# Patient Record
Sex: MALE | Race: WHITE | NOT HISPANIC OR LATINO
[De-identification: names, ages, dates, MRNs, and addresses within clinical notes are randomized per-mention and may not be internally consistent; named-entity substitution may affect disease eponyms.]

---

## 2017-02-07 ENCOUNTER — APPOINTMENT (OUTPATIENT)
Dept: CT IMAGING | Facility: CLINIC | Age: 52
End: 2017-02-07

## 2017-02-07 ENCOUNTER — OUTPATIENT (OUTPATIENT)
Dept: OUTPATIENT SERVICES | Facility: HOSPITAL | Age: 52
LOS: 1 days | End: 2017-02-07
Payer: COMMERCIAL

## 2017-02-07 DIAGNOSIS — Z00.8 ENCOUNTER FOR OTHER GENERAL EXAMINATION: ICD-10-CM

## 2017-02-07 PROCEDURE — 74177 CT ABD & PELVIS W/CONTRAST: CPT

## 2017-02-07 PROCEDURE — 71260 CT THORAX DX C+: CPT

## 2017-02-10 DIAGNOSIS — R91.8 OTHER NONSPECIFIC ABNORMAL FINDING OF LUNG FIELD: ICD-10-CM

## 2017-02-10 DIAGNOSIS — K52.9 NONINFECTIVE GASTROENTERITIS AND COLITIS, UNSPECIFIED: ICD-10-CM

## 2018-12-12 ENCOUNTER — OUTPATIENT (OUTPATIENT)
Dept: OUTPATIENT SERVICES | Facility: HOSPITAL | Age: 53
LOS: 1 days | End: 2018-12-12
Payer: COMMERCIAL

## 2018-12-12 ENCOUNTER — APPOINTMENT (OUTPATIENT)
Dept: ULTRASOUND IMAGING | Facility: CLINIC | Age: 53
End: 2018-12-12

## 2018-12-12 DIAGNOSIS — Z00.8 ENCOUNTER FOR OTHER GENERAL EXAMINATION: ICD-10-CM

## 2018-12-12 PROCEDURE — 76536 US EXAM OF HEAD AND NECK: CPT

## 2020-08-24 ENCOUNTER — EMERGENCY (EMERGENCY)
Facility: HOSPITAL | Age: 55
LOS: 1 days | Discharge: HOME | End: 2020-08-24
Attending: EMERGENCY MEDICINE
Payer: COMMERCIAL

## 2020-08-24 VITALS — SYSTOLIC BLOOD PRESSURE: 162 MMHG | DIASTOLIC BLOOD PRESSURE: 106 MMHG | HEART RATE: 74 BPM

## 2020-08-24 VITALS
RESPIRATION RATE: 16 BRPM | WEIGHT: 240.08 LBS | HEIGHT: 70 IN | OXYGEN SATURATION: 99 % | TEMPERATURE: 98 F | SYSTOLIC BLOOD PRESSURE: 157 MMHG | DIASTOLIC BLOOD PRESSURE: 102 MMHG | HEART RATE: 78 BPM

## 2020-08-24 PROCEDURE — 72100 X-RAY EXAM L-S SPINE 2/3 VWS: CPT

## 2020-08-24 PROCEDURE — 73030 X-RAY EXAM OF SHOULDER: CPT

## 2020-08-24 PROCEDURE — 99284 EMERGENCY DEPT VISIT MOD MDM: CPT | Mod: 25

## 2020-08-24 PROCEDURE — 73030 X-RAY EXAM OF SHOULDER: CPT | Mod: 26,LT

## 2020-08-24 PROCEDURE — 72100 X-RAY EXAM L-S SPINE 2/3 VWS: CPT | Mod: 26

## 2020-08-24 PROCEDURE — 70450 CT HEAD/BRAIN W/O DYE: CPT

## 2020-08-24 PROCEDURE — 70450 CT HEAD/BRAIN W/O DYE: CPT | Mod: 26

## 2020-08-24 PROCEDURE — 99285 EMERGENCY DEPT VISIT HI MDM: CPT

## 2020-08-24 RX ORDER — ACETAMINOPHEN 500 MG
975 TABLET ORAL ONCE
Refills: 0 | Status: COMPLETED | OUTPATIENT
Start: 2020-08-24 | End: 2020-08-24

## 2020-08-24 RX ORDER — IBUPROFEN 200 MG
600 TABLET ORAL ONCE
Refills: 0 | Status: COMPLETED | OUTPATIENT
Start: 2020-08-24 | End: 2020-08-24

## 2020-08-24 RX ADMIN — Medication 600 MILLIGRAM(S): at 13:05

## 2020-08-24 RX ADMIN — Medication 975 MILLIGRAM(S): at 11:35

## 2020-08-24 RX ADMIN — Medication 975 MILLIGRAM(S): at 12:05

## 2020-08-24 NOTE — ED PROVIDER NOTE - PHYSICAL EXAMINATION
CONSTITUTIONAL: Patient is awake, alert and oriented x 3. Patient is well appearing and in no acute distress.  HEAD: Small hematoma w/ abrasion left forehead ,   EYES: PERRL b/l, EOMI,   LUNGS: CTA B/L,  HEART: RRR.  ABDOMEN: Soft nd/nt, no rebound or guarding.   EXTREMITY: no edema or calf tenderness b/l, FROM right upper and lower ext b/l. LUE: FROM at wrist and elbow. Pain limited aBduction at shoulder w/ full ability adduct, flex, extend internally and externally rotate at left shoulder. No midline cervical, thoracic or lumbar ttp. There is ttp over muscles surrounding left scapula. + ttp right paraspinal lumbar area  SKIN: abrasions left forearm   NEURO: Cn3-12 grossly intact. Strength5/5UE/LE.NmlSensation.Gait normal.

## 2020-08-24 NOTE — ED PROVIDER NOTE - OBJECTIVE STATEMENT
55 year old male with pmhx acid reflux, HLD presents to ED c/o dizziness, vomiting, right lower back pain and left shoulder pain s/p fall 2 days ago. Pt works as a . Was standing on a ladder when the ladder fell out from under him causing him to fall to the ground. States hard hat took initial impact and then struck left side of his head. Does not believe he lost consciousness but states his workers reported he was a bit confused initial after fall. Pt seen at [facility] that day and given pain medication and diagnosed with concussion. Since has developed dizziness w/ HA and vomiting yesterday. He reports persistent pain in the left shoulder as well as pain in the lower back. Pt has been ambulatory and has not taken anything for pain thus far. Denies fevers, chills, cough, chest pain, sob, abd pain, diarrhea, vision changes, numbness, weakness.

## 2020-08-24 NOTE — ED PROVIDER NOTE - PROGRESS NOTE DETAILS
Pt imaging unremarkable. Family update on results and advised can  patient. Will give folow up for ortho as well as spine for persistent pain and return precautions   Amy Wolfe PA-C

## 2020-08-24 NOTE — ED PROVIDER NOTE - CARE PROVIDER_API CALL
Roberto Nassar  ORTHOPAEDIC SURGERY  [address] [place] [address], [address]  [place] [address]  Phone: [phone]  Fax: [phone]  Follow Up Time:

## 2020-08-24 NOTE — ED ADULT TRIAGE NOTE - CHIEF COMPLAINT QUOTE
fall with head injury on Saturday, Pt has had dizziness since with vomiting yesterday, woke up today still dizzy. Pt also complains of left shoulder pain and right sided lower back pain.

## 2020-08-24 NOTE — ED PROVIDER NOTE - NSFOLLOWUPINSTRUCTIONS_ED_ALL_ED_FT
1. Stay hydrated. Apply Ice 20mins on, 40 mins off alternate with heat in cycle: 20 mins on, 40 mins off. Avoid strenuous activity, twisting and heavy lifting.  2. Take Ibuprofen 600mg every 6hrs for pain as needed- take with food. Alternate with Tylenol 1g every 6 hours   3. Follow up with your PCP  24-48 hours  4. For persistent back pain follow up with the Spine [facility] by calling [phone]  5. For persistent shoulder pain follow up with Orthopedics, see above for follow up information   6. Return to ED for worsening of symptoms including increased pain, numbness, weakness, worsened headaches, uncontrolled nausea and vomiting and all other concerns.

## 2020-08-24 NOTE — ED PROVIDER NOTE - CARE PLAN
Principal Discharge DX:	Closed head injury  Secondary Diagnosis:	Lower back injury  Secondary Diagnosis:	Shoulder injury

## 2020-08-24 NOTE — ED PROVIDER NOTE - PATIENT PORTAL LINK FT
You can access the FollowMyHealth Patient Portal offered by [facility] by registering at the following website: [email or web address]. By joining [employer]’s FollowMyHealth portal, you will also be able to view your health information using other applications (apps) compatible with our system.

## 2020-08-24 NOTE — ED PROVIDER NOTE - ATTENDING CONTRIBUTION TO CARE
Patient presenting complaining of headache, L shoulder pain and R lower back pain.  Fall two days ago off approx 6-8 foot ladder, struck head during fall.  Limited memory of event.  Seen at [facility] after fall, discharged with diagnosis of concussion, no imaging performed at time.  Since that point has been feeling lightheaded, nauseated, headaches.  Also with shoulder pains without loss of ROM.  Not on blood thinners.  Denying chest pains, shortness of breath, abdominal pains.    Exam:  General: Patient well appearing, vital signs within normal limits  HEENT: airway patent with moist mucous membranes, pupils equal, round and reactive  Cardiac: RRR S1/S2 with strong peripheral pulses  Respiratory: lungs clear without respiratory distress  GI: abdomen soft, non tender, non distended  Neuro: no gross neurologic deficits, CN II-XII intact, normal strength, sensation, coordination  MSK: no midline spinal tenderness, + spasm of R lumbar musculature reproduces complaint, palpitation of L supraspinatus reproduces shoulder pain  Skin: warm, well perfused  Psych: normal mood and affect    Discussed with patient, diagnosis of concussion from [place] seems likely correct, will obtain CT head in Emergency Department to r/o associated bleed.  Will obtain imaging of L shoulder as well.  Pain control.  Likely outpatient follow up with concussion clinic.

## 2020-08-31 ENCOUNTER — EMERGENCY (EMERGENCY)
Facility: HOSPITAL | Age: 55
LOS: 1 days | Discharge: HOME | End: 2020-08-31
Attending: EMERGENCY MEDICINE | Admitting: EMERGENCY MEDICINE
Payer: COMMERCIAL

## 2020-08-31 VITALS
SYSTOLIC BLOOD PRESSURE: 151 MMHG | TEMPERATURE: 99 F | DIASTOLIC BLOOD PRESSURE: 100 MMHG | RESPIRATION RATE: 18 BRPM | HEART RATE: 73 BPM | OXYGEN SATURATION: 99 %

## 2020-08-31 VITALS
HEIGHT: 70 IN | TEMPERATURE: 98 F | HEART RATE: 83 BPM | RESPIRATION RATE: 18 BRPM | WEIGHT: 240.08 LBS | SYSTOLIC BLOOD PRESSURE: 148 MMHG | DIASTOLIC BLOOD PRESSURE: 94 MMHG | OXYGEN SATURATION: 97 %

## 2020-08-31 PROBLEM — Z78.9 OTHER SPECIFIED HEALTH STATUS: Chronic | Status: ACTIVE | Noted: 2020-08-24

## 2020-08-31 LAB
ALBUMIN SERPL ELPH-MCNC: 4.4 G/DL — SIGNIFICANT CHANGE UP (ref 3.3–5)
ALP SERPL-CCNC: 52 U/L — SIGNIFICANT CHANGE UP (ref 40–120)
ALT FLD-CCNC: 17 U/L — SIGNIFICANT CHANGE UP (ref 10–45)
ANION GAP SERPL CALC-SCNC: 13 MMOL/L — SIGNIFICANT CHANGE UP (ref 5–17)
AST SERPL-CCNC: 13 U/L — SIGNIFICANT CHANGE UP (ref 10–40)
BASOPHILS # BLD AUTO: 0.07 K/UL — SIGNIFICANT CHANGE UP (ref 0–0.2)
BASOPHILS NFR BLD AUTO: 0.8 % — SIGNIFICANT CHANGE UP (ref 0–2)
BILIRUB SERPL-MCNC: 0.4 MG/DL — SIGNIFICANT CHANGE UP (ref 0.2–1.2)
BUN SERPL-MCNC: 11 MG/DL — SIGNIFICANT CHANGE UP (ref 7–23)
CALCIUM SERPL-MCNC: 9.5 MG/DL — SIGNIFICANT CHANGE UP (ref 8.4–10.5)
CHLORIDE SERPL-SCNC: 106 MMOL/L — SIGNIFICANT CHANGE UP (ref 96–108)
CO2 SERPL-SCNC: 23 MMOL/L — SIGNIFICANT CHANGE UP (ref 22–31)
CREAT SERPL-MCNC: 0.96 MG/DL — SIGNIFICANT CHANGE UP (ref 0.5–1.3)
EOSINOPHIL # BLD AUTO: 0.42 K/UL — SIGNIFICANT CHANGE UP (ref 0–0.5)
EOSINOPHIL NFR BLD AUTO: 4.6 % — SIGNIFICANT CHANGE UP (ref 0–6)
GLUCOSE SERPL-MCNC: 120 MG/DL — HIGH (ref 70–99)
HCT VFR BLD CALC: 49.4 % — SIGNIFICANT CHANGE UP (ref 39–50)
HGB BLD-MCNC: 16.2 G/DL — SIGNIFICANT CHANGE UP (ref 13–17)
IMM GRANULOCYTES NFR BLD AUTO: 0.1 % — SIGNIFICANT CHANGE UP (ref 0–1.5)
LYMPHOCYTES # BLD AUTO: 2.58 K/UL — SIGNIFICANT CHANGE UP (ref 1–3.3)
LYMPHOCYTES # BLD AUTO: 28 % — SIGNIFICANT CHANGE UP (ref 13–44)
MCHC RBC-ENTMCNC: 28.2 PG — SIGNIFICANT CHANGE UP (ref 27–34)
MCHC RBC-ENTMCNC: 32.8 GM/DL — SIGNIFICANT CHANGE UP (ref 32–36)
MCV RBC AUTO: 86.1 FL — SIGNIFICANT CHANGE UP (ref 80–100)
MONOCYTES # BLD AUTO: 0.56 K/UL — SIGNIFICANT CHANGE UP (ref 0–0.9)
MONOCYTES NFR BLD AUTO: 6.1 % — SIGNIFICANT CHANGE UP (ref 2–14)
NEUTROPHILS # BLD AUTO: 5.59 K/UL — SIGNIFICANT CHANGE UP (ref 1.8–7.4)
NEUTROPHILS NFR BLD AUTO: 60.4 % — SIGNIFICANT CHANGE UP (ref 43–77)
NRBC # BLD: 0 /100 WBCS — SIGNIFICANT CHANGE UP (ref 0–0)
PLATELET # BLD AUTO: 242 K/UL — SIGNIFICANT CHANGE UP (ref 150–400)
POTASSIUM SERPL-MCNC: 4.1 MMOL/L — SIGNIFICANT CHANGE UP (ref 3.5–5.3)
POTASSIUM SERPL-SCNC: 4.1 MMOL/L — SIGNIFICANT CHANGE UP (ref 3.5–5.3)
PROT SERPL-MCNC: 7.1 G/DL — SIGNIFICANT CHANGE UP (ref 6–8.3)
RBC # BLD: 5.74 M/UL — SIGNIFICANT CHANGE UP (ref 4.2–5.8)
RBC # FLD: 13.7 % — SIGNIFICANT CHANGE UP (ref 10.3–14.5)
SODIUM SERPL-SCNC: 142 MMOL/L — SIGNIFICANT CHANGE UP (ref 135–145)
WBC # BLD: 9.23 K/UL — SIGNIFICANT CHANGE UP (ref 3.8–10.5)
WBC # FLD AUTO: 9.23 K/UL — SIGNIFICANT CHANGE UP (ref 3.8–10.5)

## 2020-08-31 PROCEDURE — 70496 CT ANGIOGRAPHY HEAD: CPT

## 2020-08-31 PROCEDURE — 70450 CT HEAD/BRAIN W/O DYE: CPT

## 2020-08-31 PROCEDURE — 70498 CT ANGIOGRAPHY NECK: CPT

## 2020-08-31 PROCEDURE — 99284 EMERGENCY DEPT VISIT MOD MDM: CPT | Mod: 25

## 2020-08-31 PROCEDURE — 85027 COMPLETE CBC AUTOMATED: CPT

## 2020-08-31 PROCEDURE — 93005 ELECTROCARDIOGRAM TRACING: CPT

## 2020-08-31 PROCEDURE — 70496 CT ANGIOGRAPHY HEAD: CPT | Mod: 26

## 2020-08-31 PROCEDURE — 80053 COMPREHEN METABOLIC PANEL: CPT

## 2020-08-31 PROCEDURE — 96360 HYDRATION IV INFUSION INIT: CPT | Mod: XU

## 2020-08-31 PROCEDURE — 99285 EMERGENCY DEPT VISIT HI MDM: CPT

## 2020-08-31 PROCEDURE — 93010 ELECTROCARDIOGRAM REPORT: CPT

## 2020-08-31 PROCEDURE — 70498 CT ANGIOGRAPHY NECK: CPT | Mod: 26

## 2020-08-31 RX ORDER — MECLIZINE HCL 12.5 MG
1 TABLET ORAL
Qty: 15 | Refills: 0
Start: 2020-08-31 | End: 2020-09-04

## 2020-08-31 RX ORDER — ACETAMINOPHEN 500 MG
975 TABLET ORAL ONCE
Refills: 0 | Status: COMPLETED | OUTPATIENT
Start: 2020-08-31 | End: 2020-08-31

## 2020-08-31 RX ORDER — SODIUM CHLORIDE 9 MG/ML
500 INJECTION INTRAMUSCULAR; INTRAVENOUS; SUBCUTANEOUS ONCE
Refills: 0 | Status: COMPLETED | OUTPATIENT
Start: 2020-08-31 | End: 2020-08-31

## 2020-08-31 RX ORDER — MECLIZINE HCL 12.5 MG
25 TABLET ORAL ONCE
Refills: 0 | Status: COMPLETED | OUTPATIENT
Start: 2020-08-31 | End: 2020-08-31

## 2020-08-31 RX ADMIN — Medication 25 MILLIGRAM(S): at 12:15

## 2020-08-31 RX ADMIN — SODIUM CHLORIDE 500 MILLILITER(S): 9 INJECTION INTRAMUSCULAR; INTRAVENOUS; SUBCUTANEOUS at 13:57

## 2020-08-31 RX ADMIN — SODIUM CHLORIDE 500 MILLILITER(S): 9 INJECTION INTRAMUSCULAR; INTRAVENOUS; SUBCUTANEOUS at 15:00

## 2020-08-31 NOTE — ED PROVIDER NOTE - CHILD ABUSE FACILITY
Jose Antonio,     Your blood pressure and exam look good.     I encourage you to have colon screening. If you want to pursue colonoscopy call [facility] [phone] Dr. August's . Let her know that you are calling to arrange a date and time for the procedure.    Alternatively you can have the Cologuard test. Check with your insurance on coverage and let me know if you want to do that.     We will check your cholesterol, sugar, kidney and liver function, and PSA (prostate blood test).     I suggest the Shingrix shingles vaccine. This is recommended for everyone 50 years old and above. There are 2 doses 2 to 6 months apart. It is on backorder so call and get on a list at the pharmacy.      Try Lotrimin AF cream for your toes and feet, 2 times per day for 2 weeks and see if the dry skin and irritation improves.     The tingling of your feet suggests peripheral neuropathy. We can treat that with oral medication if you wish.     You received a flu vaccine.     Continue to stay active.        [facility]

## 2020-08-31 NOTE — ED PROVIDER NOTE - PATIENT PORTAL LINK FT
You can access the FollowMyHealth Patient Portal offered by [facility] by registering at the following website: [email or web address]. By joining [employer]’s FollowMyHealth portal, you will also be able to view your health information using other applications (apps) compatible with our system.

## 2020-08-31 NOTE — ED PROVIDER NOTE - OBJECTIVE STATEMENT
56 yo M with hx of HLD presents with persistent dizziness and left sided neck pain for 1 week in the setting of a recent mechanical fall. Reports episodic "room spinning" sensation, induced by head movement, lasting seconds with associated unilateral headache and neck pain. Better by motrin or tylenol. Denies visual deficits, chest pain or sob. Was seen at [facility] 1 week ago and at this hospital a few days ago with normal CT.

## 2020-08-31 NOTE — ED ADULT NURSE NOTE - OBJECTIVE STATEMENT
54 yo male presents to ED c/o persistent dizziness and L sided neck pain x1 week, s/p mechanical fall off 6 foot ladder. Patient reports intermittent room spinning, worse with head movement. Patient states he was seen at [facility] 1 week ago and [facility] this week with normal CTs. Patient denies SOB, CP, nvd, fever/chills, sick contacts, visual changes, numbness, weakness. Patient A&OX3, breathing spontaneously, airway patent, bl clear lungs, abdomen nontender, +pulses, cap refill <2 seconds, ambulating in straight line without difficulty. Patient resting in bed, side rails up, plan of care explained. MD at bedside.

## 2020-08-31 NOTE — ED PROVIDER NOTE - ATTENDING CONTRIBUTION TO CARE
RGUJRAL 56yo male hx HLD s/p fall from a ladder 1 week ago presents with headache, neck pain and dizziness. States he was seen here on 8/24 had n/v  x 2 d. Still reports L side HA, neck pain and dizziness when he moves his head. No chest pain, palp, sob. Pt has now begin tolerating but less then normal.  On exam,   Patient is awake, alert x 3.  GCS15. NCAT, PERRL. +  horizontal nystagmus.  Neck: No Posterior midline cervical spine tenderness. Full ROM and neuro intact. L paraspinal tenderness  Chest is clear to auscultation. +S1S2.  Abdomen is soft nondistended/nontender +BS. No rebound or guarding.   Pelvis is stable. Full ROM B/L hips.   Back non tender midline T/L spine.  Skin intact   Check Labs, CT head/neck to eval for dissection. Pain control.

## 2020-08-31 NOTE — ED PROVIDER NOTE - CLINICAL SUMMARY MEDICAL DECISION MAKING FREE TEXT BOX
56 yo M with hx of HLD presents with persistent dizziness and left sided neck pain for 1 week in the setting of a recent mechanical fall. VS WNL Horizontal nystagmus. No focal sensory or motor deficits, normal CN exam, no dysmetria, able to ambulate in a straight line. VS WNL. Consistent with peripheral vertigo likely induced by post-concussion syndrome. However, given persistent headache and neck pain, will evaluate for cervical artery dissection and delayed subdural hematoma. Unlikely from a cardiac etiology. Will check electrolytes, ekg, ct head noncon, ct head / neck angio cleo + symptomatic treatment.

## 2020-08-31 NOTE — ED PROVIDER NOTE - NSFOLLOWUPINSTRUCTIONS_ED_ALL_ED_FT
You were seen in the ED for dizziness  The following labs/imaging were obtained: see attached (if applicable)  Take meclizine as instructed. Avoid driving while taking this med.   Return to the ED if you develop leg or arm weakness, slurred speech, inability to ambulate or worsening or new concerning symptoms.  Follow up neurology in 1-2 weeks. You will be called to arrange this.   Discussed with pt results of work up, strict return precautions, and need for follow up.  Pt expressed understanding and agrees with plan.

## 2020-08-31 NOTE — ED ADULT NURSE REASSESSMENT NOTE - NS ED NURSE REASSESS COMMENT FT1
Report received from lolis Arthur RN. Pt AAOx4, NAD, resp nonlabored, skin warm/dry, resting comfortably in bed with call bell at bedside. Pt c/o 4/10 back pain but denies Tylenol at this time. Pt denies headache, dizziness, chest pain, palpitations, SOB, abd pain, n/v/d, urinary symptoms, fevers, chills, weakness at this time. Pt awaiting CT. Safety maintained.

## 2020-08-31 NOTE — ED PROVIDER NOTE - PHYSICAL EXAMINATION
Gen: AAOx3, non-toxic  Head: NCAT  HEENT: EOMI, oral mucosa moist, normal conjunctiva  Lung: CTAB, no respiratory distress, no wheezes/rhonchi/rales B/L, speaking in full sentences  CV: RRR, no murmurs, rubs or gallops  Abd: soft, NTND, no guarding, no CVA tenderness  MSK: left sided posterior and anterior neck pain.   Neuro: Horizontal nystagmus. No focal sensory or motor deficits, normal CN exam, no dysmetria, able to ambulate in a straight line.   Skin: Warm, well perfused, no rash  Psych: normal affect.

## 2020-08-31 NOTE — ED PROVIDER NOTE - NS ED ROS FT
GENERAL: No fever or chills  EYES: no change in vision  HEENT: see hpi  CARDIAC: no chest pain or palpiations   PULMONARY: no cough or SOB  GI: no abdominal pain, nausea, vomiting, diarrhea, or constipation   : No changes in urination  SKIN: no rashes  NEURO: see hpi  MSK: see hpi

## 2021-08-11 NOTE — ED ADULT NURSE NOTE - BREATH SOUNDS, MLM
Received message from patient's nurse stating:    pt is having some nosebleeds w/ some clots, vitals stable, tried to offer interventions and rationales explained that it could be from ASA and Eliquis. Patient would like to speak with provider. Discussion / orders:    Per patient's nurse, nosebleed only occurs when patient blows his nose. He does have CBC ordered for a.m. I did present to patient bedside and spoke with him. There is no blood in sputum when patient coughs. Does have bleeding when he blows his nose. · Ordered saline nasal spray, 2 sprays each nostril q2h as needed for nasal dryness or congestion. Please note that this note was dictated using Dragon computer voice recognition software. Quite often unanticipated grammatical, syntax, homophones, and other interpretive errors are inadvertently transcribed by the computer software. Please disregard these errors. Please excuse any errors that have escaped final proofreading. Clear

## 2022-12-14 PROBLEM — Z00.00 ENCOUNTER FOR PREVENTIVE HEALTH EXAMINATION: Status: ACTIVE | Noted: 2022-12-14

## 2022-12-20 ENCOUNTER — NON-APPOINTMENT (OUTPATIENT)
Age: 57
End: 2022-12-20

## 2022-12-20 ENCOUNTER — APPOINTMENT (OUTPATIENT)
Dept: INTERNAL MEDICINE | Facility: CLINIC | Age: 57
End: 2022-12-20

## 2022-12-20 VITALS
HEART RATE: 89 BPM | WEIGHT: 249 LBS | BODY MASS INDEX: 35.65 KG/M2 | SYSTOLIC BLOOD PRESSURE: 142 MMHG | TEMPERATURE: 97.8 F | OXYGEN SATURATION: 97 % | DIASTOLIC BLOOD PRESSURE: 88 MMHG | HEIGHT: 70 IN

## 2022-12-20 PROCEDURE — G0296 VISIT TO DETERM LDCT ELIG: CPT

## 2022-12-20 PROCEDURE — 99386 PREV VISIT NEW AGE 40-64: CPT | Mod: 25

## 2022-12-26 LAB
25(OH)D3 SERPL-MCNC: 19.8 NG/ML
ALBUMIN SERPL ELPH-MCNC: 4.3 G/DL
ALP BLD-CCNC: 79 U/L
ALT SERPL-CCNC: 20 U/L
ANION GAP SERPL CALC-SCNC: 12 MMOL/L
APPEARANCE: CLEAR
AST SERPL-CCNC: 10 U/L
BACTERIA: NEGATIVE
BASOPHILS # BLD AUTO: 0.05 K/UL
BASOPHILS NFR BLD AUTO: 0.5 %
BILIRUB SERPL-MCNC: 0.4 MG/DL
BILIRUBIN URINE: NEGATIVE
BLOOD URINE: NEGATIVE
BUN SERPL-MCNC: 11 MG/DL
CALCIUM SERPL-MCNC: 9.5 MG/DL
CHLORIDE SERPL-SCNC: 104 MMOL/L
CHOLEST SERPL-MCNC: 185 MG/DL
CO2 SERPL-SCNC: 26 MMOL/L
COLOR: YELLOW
CREAT SERPL-MCNC: 1.02 MG/DL
EGFR: 86 ML/MIN/1.73M2
EOSINOPHIL # BLD AUTO: 0.24 K/UL
EOSINOPHIL NFR BLD AUTO: 2.5 %
ESTIMATED AVERAGE GLUCOSE: 126 MG/DL
GLUCOSE QUALITATIVE U: NEGATIVE
GLUCOSE SERPL-MCNC: 102 MG/DL
HBA1C MFR BLD HPLC: 6 %
HCT VFR BLD CALC: 44 %
HDLC SERPL-MCNC: 33 MG/DL
HGB BLD-MCNC: 14.2 G/DL
HYALINE CASTS: 0 /LPF
IMM GRANULOCYTES NFR BLD AUTO: 0.3 %
KETONES URINE: NEGATIVE
LDLC SERPL CALC-MCNC: 113 MG/DL
LEUKOCYTE ESTERASE URINE: NEGATIVE
LYMPHOCYTES # BLD AUTO: 2.58 K/UL
LYMPHOCYTES NFR BLD AUTO: 27 %
MAN DIFF?: NORMAL
MCHC RBC-ENTMCNC: 26.7 PG
MCHC RBC-ENTMCNC: 32.3 GM/DL
MCV RBC AUTO: 82.7 FL
MICROSCOPIC-UA: NORMAL
MONOCYTES # BLD AUTO: 0.62 K/UL
MONOCYTES NFR BLD AUTO: 6.5 %
NEUTROPHILS # BLD AUTO: 6.02 K/UL
NEUTROPHILS NFR BLD AUTO: 63.2 %
NITRITE URINE: NEGATIVE
NONHDLC SERPL-MCNC: 152 MG/DL
PH URINE: 6
PLATELET # BLD AUTO: 284 K/UL
POTASSIUM SERPL-SCNC: 4.2 MMOL/L
PROT SERPL-MCNC: 6.8 G/DL
PROTEIN URINE: NEGATIVE
PSA SERPL-MCNC: 0.68 NG/ML
RBC # BLD: 5.32 M/UL
RBC # FLD: 15.1 %
RED BLOOD CELLS URINE: 2 /HPF
SODIUM SERPL-SCNC: 142 MMOL/L
SPECIFIC GRAVITY URINE: 1.02
SQUAMOUS EPITHELIAL CELLS: 0 /HPF
TRIGL SERPL-MCNC: 195 MG/DL
TSH SERPL-ACNC: 2.07 UIU/ML
UROBILINOGEN URINE: NORMAL
VIT B12 SERPL-MCNC: 302 PG/ML
WBC # FLD AUTO: 9.54 K/UL
WHITE BLOOD CELLS URINE: 0 /HPF

## 2023-01-04 ENCOUNTER — NON-APPOINTMENT (OUTPATIENT)
Age: 58
End: 2023-01-04

## 2023-01-04 VITALS — HEIGHT: 70 IN | WEIGHT: 249 LBS | BODY MASS INDEX: 35.65 KG/M2

## 2023-01-05 ENCOUNTER — OUTPATIENT (OUTPATIENT)
Dept: OUTPATIENT SERVICES | Facility: HOSPITAL | Age: 58
LOS: 1 days | End: 2023-01-05
Payer: MEDICAID

## 2023-01-05 ENCOUNTER — APPOINTMENT (OUTPATIENT)
Dept: CT IMAGING | Facility: CLINIC | Age: 58
End: 2023-01-05

## 2023-01-05 ENCOUNTER — RESULT REVIEW (OUTPATIENT)
Age: 58
End: 2023-01-05

## 2023-01-05 DIAGNOSIS — F17.210 NICOTINE DEPENDENCE, CIGARETTES, UNCOMPLICATED: ICD-10-CM

## 2023-01-05 DIAGNOSIS — K29.60 OTHER GASTRITIS W/OUT BLEEDING: ICD-10-CM

## 2023-01-05 PROCEDURE — 71271 CT THORAX LUNG CANCER SCR C-: CPT

## 2023-04-03 ENCOUNTER — RX RENEWAL (OUTPATIENT)
Age: 58
End: 2023-04-03

## 2023-06-30 ENCOUNTER — RX RENEWAL (OUTPATIENT)
Age: 58
End: 2023-06-30

## 2023-08-21 ENCOUNTER — LABORATORY RESULT (OUTPATIENT)
Age: 58
End: 2023-08-21

## 2023-08-21 ENCOUNTER — APPOINTMENT (OUTPATIENT)
Dept: INTERNAL MEDICINE | Facility: CLINIC | Age: 58
End: 2023-08-21
Payer: MEDICAID

## 2023-08-21 VITALS
OXYGEN SATURATION: 98 % | TEMPERATURE: 97.8 F | WEIGHT: 247 LBS | HEART RATE: 93 BPM | HEIGHT: 69 IN | BODY MASS INDEX: 36.58 KG/M2 | SYSTOLIC BLOOD PRESSURE: 128 MMHG | DIASTOLIC BLOOD PRESSURE: 87 MMHG

## 2023-08-21 PROCEDURE — 99214 OFFICE O/P EST MOD 30 MIN: CPT | Mod: 25

## 2023-08-21 RX ORDER — METRONIDAZOLE 500 MG/1
500 TABLET ORAL 3 TIMES DAILY
Qty: 21 | Refills: 0 | Status: ACTIVE | COMMUNITY
Start: 2023-08-21 | End: 1900-01-01

## 2023-08-21 NOTE — ASSESSMENT
[FreeTextEntry1] : Blood work done today Will check lipid panel Blood pressure is stable Given previous episodes of diverticulitis-treat with cipro and flagyl Call office if worsening symptoms-check UA Consider CT scan if no improvement

## 2023-08-21 NOTE — HISTORY OF PRESENT ILLNESS
[FreeTextEntry1] : Patient presents for follow-up.  [de-identified] : Approximately 2 weeks ago did bilateral abdominal pain similar to previous diverticulitis, took Cipro and Flagyl with alleviation of symptoms did have leftover at home currently started having pain again denies any blood in the stool denies any fevers chills.  Denies any nausea vomiting.  Did have a colonoscopy done denies any urinary symptoms. Denies any chest pain chest tightness shortness of breath orthopnea paroxysmal nocturnal dyspnea

## 2023-08-21 NOTE — PHYSICAL EXAM
[Normal] : no carotid or abdominal bruits heard, no varicosities, pedal pulses are present, no peripheral edema, no extremity clubbing or cyanosis and no palpable aorta [de-identified] : tenderness to palpation suprapubic region, left lower quadrant pain tenderness to deep palpation no rebound or guarding

## 2023-08-23 ENCOUNTER — EMERGENCY (EMERGENCY)
Facility: HOSPITAL | Age: 58
LOS: 1 days | Discharge: HOME | End: 2023-08-23
Attending: EMERGENCY MEDICINE
Payer: MEDICAID

## 2023-08-23 VITALS
DIASTOLIC BLOOD PRESSURE: 77 MMHG | SYSTOLIC BLOOD PRESSURE: 155 MMHG | RESPIRATION RATE: 19 BRPM | HEART RATE: 84 BPM | TEMPERATURE: 98 F | OXYGEN SATURATION: 99 %

## 2023-08-23 VITALS
RESPIRATION RATE: 18 BRPM | OXYGEN SATURATION: 96 % | TEMPERATURE: 98 F | HEIGHT: 70 IN | SYSTOLIC BLOOD PRESSURE: 140 MMHG | WEIGHT: 244.93 LBS | DIASTOLIC BLOOD PRESSURE: 90 MMHG | HEART RATE: 93 BPM

## 2023-08-23 DIAGNOSIS — Z98.890 OTHER SPECIFIED POSTPROCEDURAL STATES: Chronic | ICD-10-CM

## 2023-08-23 DIAGNOSIS — Z98.1 ARTHRODESIS STATUS: Chronic | ICD-10-CM

## 2023-08-23 LAB
ALBUMIN SERPL ELPH-MCNC: 4.3 G/DL — SIGNIFICANT CHANGE UP (ref 3.3–5)
ALP SERPL-CCNC: 63 U/L — SIGNIFICANT CHANGE UP (ref 40–120)
ALT FLD-CCNC: 16 U/L — SIGNIFICANT CHANGE UP (ref 10–45)
ANION GAP SERPL CALC-SCNC: 13 MMOL/L — SIGNIFICANT CHANGE UP (ref 5–17)
AST SERPL-CCNC: 14 U/L — SIGNIFICANT CHANGE UP (ref 10–40)
BASOPHILS # BLD AUTO: 0.05 K/UL — SIGNIFICANT CHANGE UP (ref 0–0.2)
BASOPHILS NFR BLD AUTO: 0.5 % — SIGNIFICANT CHANGE UP (ref 0–2)
BILIRUB SERPL-MCNC: 0.3 MG/DL — SIGNIFICANT CHANGE UP (ref 0.2–1.2)
BUN SERPL-MCNC: 12 MG/DL — SIGNIFICANT CHANGE UP (ref 7–23)
CALCIUM SERPL-MCNC: 9 MG/DL — SIGNIFICANT CHANGE UP (ref 8.4–10.5)
CHLORIDE SERPL-SCNC: 106 MMOL/L — SIGNIFICANT CHANGE UP (ref 96–108)
CK SERPL-CCNC: 382 U/L — HIGH (ref 30–200)
CO2 SERPL-SCNC: 25 MMOL/L — SIGNIFICANT CHANGE UP (ref 22–31)
CREAT SERPL-MCNC: 0.88 MG/DL — SIGNIFICANT CHANGE UP (ref 0.5–1.3)
EGFR: 100 ML/MIN/1.73M2 — SIGNIFICANT CHANGE UP
EOSINOPHIL # BLD AUTO: 0.09 K/UL — SIGNIFICANT CHANGE UP (ref 0–0.5)
EOSINOPHIL NFR BLD AUTO: 0.9 % — SIGNIFICANT CHANGE UP (ref 0–6)
GLUCOSE SERPL-MCNC: 133 MG/DL — HIGH (ref 70–99)
HCT VFR BLD CALC: 45.3 % — SIGNIFICANT CHANGE UP (ref 39–50)
HGB BLD-MCNC: 14.2 G/DL — SIGNIFICANT CHANGE UP (ref 13–17)
IMM GRANULOCYTES NFR BLD AUTO: 0.3 % — SIGNIFICANT CHANGE UP (ref 0–0.9)
LYMPHOCYTES # BLD AUTO: 2.37 K/UL — SIGNIFICANT CHANGE UP (ref 1–3.3)
LYMPHOCYTES # BLD AUTO: 23.1 % — SIGNIFICANT CHANGE UP (ref 13–44)
MCHC RBC-ENTMCNC: 26.9 PG — LOW (ref 27–34)
MCHC RBC-ENTMCNC: 31.3 GM/DL — LOW (ref 32–36)
MCV RBC AUTO: 86 FL — SIGNIFICANT CHANGE UP (ref 80–100)
MONOCYTES # BLD AUTO: 0.54 K/UL — SIGNIFICANT CHANGE UP (ref 0–0.9)
MONOCYTES NFR BLD AUTO: 5.3 % — SIGNIFICANT CHANGE UP (ref 2–14)
NEUTROPHILS # BLD AUTO: 7.16 K/UL — SIGNIFICANT CHANGE UP (ref 1.8–7.4)
NEUTROPHILS NFR BLD AUTO: 69.9 % — SIGNIFICANT CHANGE UP (ref 43–77)
NRBC # BLD: 0 /100 WBCS — SIGNIFICANT CHANGE UP (ref 0–0)
PLATELET # BLD AUTO: 290 K/UL — SIGNIFICANT CHANGE UP (ref 150–400)
POTASSIUM SERPL-MCNC: 4 MMOL/L — SIGNIFICANT CHANGE UP (ref 3.5–5.3)
POTASSIUM SERPL-SCNC: 4 MMOL/L — SIGNIFICANT CHANGE UP (ref 3.5–5.3)
PROT SERPL-MCNC: 7.5 G/DL — SIGNIFICANT CHANGE UP (ref 6–8.3)
RBC # BLD: 5.27 M/UL — SIGNIFICANT CHANGE UP (ref 4.2–5.8)
RBC # FLD: 14.8 % — HIGH (ref 10.3–14.5)
SODIUM SERPL-SCNC: 144 MMOL/L — SIGNIFICANT CHANGE UP (ref 135–145)
WBC # BLD: 10.24 K/UL — SIGNIFICANT CHANGE UP (ref 3.8–10.5)
WBC # FLD AUTO: 10.24 K/UL — SIGNIFICANT CHANGE UP (ref 3.8–10.5)

## 2023-08-23 PROCEDURE — 70450 CT HEAD/BRAIN W/O DYE: CPT | Mod: MA

## 2023-08-23 PROCEDURE — 80053 COMPREHEN METABOLIC PANEL: CPT

## 2023-08-23 PROCEDURE — 85025 COMPLETE CBC W/AUTO DIFF WBC: CPT

## 2023-08-23 PROCEDURE — 72170 X-RAY EXAM OF PELVIS: CPT | Mod: 26

## 2023-08-23 PROCEDURE — 72170 X-RAY EXAM OF PELVIS: CPT

## 2023-08-23 PROCEDURE — 82550 ASSAY OF CK (CPK): CPT

## 2023-08-23 PROCEDURE — 96365 THER/PROPH/DIAG IV INF INIT: CPT

## 2023-08-23 PROCEDURE — 71045 X-RAY EXAM CHEST 1 VIEW: CPT

## 2023-08-23 PROCEDURE — 71045 X-RAY EXAM CHEST 1 VIEW: CPT | Mod: 26

## 2023-08-23 PROCEDURE — 99284 EMERGENCY DEPT VISIT MOD MDM: CPT | Mod: 25

## 2023-08-23 PROCEDURE — 70450 CT HEAD/BRAIN W/O DYE: CPT | Mod: 26,MA

## 2023-08-23 PROCEDURE — 72100 X-RAY EXAM L-S SPINE 2/3 VWS: CPT | Mod: 26

## 2023-08-23 PROCEDURE — 72125 CT NECK SPINE W/O DYE: CPT | Mod: MA

## 2023-08-23 PROCEDURE — 72125 CT NECK SPINE W/O DYE: CPT | Mod: 26,MA

## 2023-08-23 PROCEDURE — 99284 EMERGENCY DEPT VISIT MOD MDM: CPT

## 2023-08-23 PROCEDURE — 72100 X-RAY EXAM L-S SPINE 2/3 VWS: CPT

## 2023-08-23 RX ORDER — LIDOCAINE 4 G/100G
1 CREAM TOPICAL ONCE
Refills: 0 | Status: COMPLETED | OUTPATIENT
Start: 2023-08-23 | End: 2023-08-23

## 2023-08-23 RX ORDER — ACETAMINOPHEN 500 MG
1000 TABLET ORAL ONCE
Refills: 0 | Status: COMPLETED | OUTPATIENT
Start: 2023-08-23 | End: 2023-08-23

## 2023-08-23 RX ADMIN — Medication 1000 MILLIGRAM(S): at 16:30

## 2023-08-23 RX ADMIN — Medication 400 MILLIGRAM(S): at 15:59

## 2023-08-23 RX ADMIN — LIDOCAINE 1 PATCH: 4 CREAM TOPICAL at 15:58

## 2023-08-23 NOTE — ED ADULT NURSE NOTE - NS ED NURSE LEVEL OF CONSCIOUSNESS ORIENTATION
Pt took the last of his medication today, is out. Please rush as urgent.    Will  at , please call.    Thanks  Anjum SHERIFF  Team Coodinator     Oriented - self; Oriented - place; Oriented - time

## 2023-08-23 NOTE — ED ADULT TRIAGE NOTE - CHIEF COMPLAINT QUOTE
fell down 12 steps at home 45min ago states he hit his head he is able to walk but is in a lot of back and neck pain. hx spinal infusion sx 1/22 &  back sx 8/29/22. denies AC use, denies LOC

## 2023-08-23 NOTE — ED ADULT NURSE NOTE - NSFALLRISKINTERV_ED_ALL_ED

## 2023-08-23 NOTE — ED PROVIDER NOTE - NSFOLLOWUPINSTRUCTIONS_ED_ALL_ED_FT
Follow-up with your primary care provider within 2-3 days.   Call the spine center at [phone] for a Follow-up within 1 week if symptoms persist.  Bring any printed results to discuss with your PCP.    If you require the disc of results from your imaging study or any additional results, please call medical records at [phone].     Pain can be managed with Acetaminophen (aka Tylenol) and over the counter as directed. You may also use over the counter lidocaine patch or Salonpas patch for pain.    Continue to take all other medications as directed.    Pain may worsens 1-2 days after the accident, this is typical however if your pain persists, becomes severe, or if you experience any severe headache, midline spine/back pain, vomiting, loss of vision, numbness/tingling, weakness, difficulty urinating or defecating (pooping), confusion, loss of consciousness (passing out), chest pain, or if any other concerning symptoms please return to the ER.

## 2023-08-23 NOTE — ED PROVIDER NOTE - CLINICAL SUMMARY MEDICAL DECISION MAKING FREE TEXT BOX
58M hx lumbar spinal fusion (Aug 22) and disc replacement x3 in c spine (Jan 22), presenting s/p mechanical fall down 12 stairs at home 1h ago. Denying LOC, vomiting, changed in mental status; not on thinners. Will obtain imaging CXR, pelvis XR; adding CTH and CT spine given head strike, decreased ROM of c spine and prior surgeries in the area. Lower concern for syncopal etiology of fall given lack of prodromal symptoms and description of a mechanical fall, will defer syncopal w/u at this point. Pain control with 1g Tylenol IV. 58M hx lumbar spinal fusion (Aug 22) and disc replacement x3 in c spine (Jan 22), presenting s/p mechanical fall down 12 stairs at home 1h ago. Denying LOC, vomiting, changed in mental status; not on thinners,  awake alert . Will obtain imaging CXR, pelvis XR; adding CTH and CT spine given head strike, decreased ROM of c spine and prior surgeries in the area. Lower concern for syncopal etiology of fall given lack of prodromal symptoms and description of a mechanical fall, will defer syncopal w/u at this point. Pain control with 1g Tylenol Iv. reassess ZR

## 2023-08-23 NOTE — ED PROVIDER NOTE - PHYSICAL EXAMINATION
PHYSICAL EXAM:  GENERAL: NAD, lying in bed comfortably  HEAD:  nontender to palpation, no appreciable skull fractures, no lacerations  EYES: EOMI, PERRLA, conjunctiva and sclera clear  ENT: Moist mucous membranes  NECK: Supple, No JVD; mildly decreased ROM with rotation of head to the L; pain with movement of c spine in all directions; no midline TTP  CHEST/LUNG: Clear to auscultation bilaterally; No rales, rhonchi, wheezing, or rubs. Unlabored respirations  HEART: Regular rate and rhythm; No murmurs, rubs, or gallops  ABDOMEN: Soft, Nontender, Nondistended   EXTREMITIES:  2+ Peripheral Pulses, brisk capillary refill. No clubbing, cyanosis, or edema  NERVOUS SYSTEM:  Alert & Oriented X3, speech clear. No deficits   MSK: FROM all 4 extremities, full and equal strength; no midline spine tenderness to palpation   SKIN: abrasions diffusely over over PHYSICAL EXAM:  GENERAL: NAD, lying in bed comfortably  HEAD:  nontender to palpation, no appreciable skull fractures, no lacerations  EYES: EOMI, PERRLA, conjunctiva and sclera clear  ENT: Moist mucous membranes  NECK: Supple, No JVD; mildly decreased ROM with rotation of head to the L; pain with movement of c spine in all directions; no midline TTP  CHEST/LUNG: Clear to auscultation bilaterally; No rales, rhonchi, wheezing, or rubs. Unlabored respirations  HEART: Regular rate and rhythm; No murmurs, rubs, or gallops  ABDOMEN: Soft, Nontender, Nondistended   EXTREMITIES:  2+ Peripheral Pulses, brisk capillary refill. No clubbing, cyanosis, or edema  NERVOUS SYSTEM:  Alert & Oriented X3, speech clear. No deficits   MSK: FROM all 4 extremities, full and equal strength; no midline spine tenderness to palpation   SKIN: abrasions diffusely over back

## 2023-08-23 NOTE — ED ADULT NURSE NOTE - OBJECTIVE STATEMENT
59 y/o male, A&O x3, presents to ED c/o fall. PMH lumbar spinal fusion 8/22 and disc replacement in C spine 1/22. Pt endorses loosing his balance and RLE weakness that made him fall down 12 stairs, landing on his back. Pt endorses hitting his head on the way down but no LOC. Pt endorses dizziness, headache, and pinching sensation in RLE. Upon assessment pupils reactive, brisk, 3mm bilaterally and equal strength in all extremities. Pt denies blood thinners, LOC, chest pain, SOB, abdominal pain, and n/v/d. Pt comfortably resting in C-collar in bed locked in lowest position.

## 2023-08-23 NOTE — ED PROVIDER NOTE - PATIENT PORTAL LINK FT
You can access the FollowMyHealth Patient Portal offered by [facility] by registering at the following website: [email or web address]. By joining [employer]’s FollowMyHealth portal, you will also be able to view your health information using other applications (apps) compatible with our system.

## 2023-08-23 NOTE — ED PROVIDER NOTE - CROS ED ROS STATEMENT
Pre-Operative H & P     ADDENDUM:  The patient completed her carotid US and echo. The patient is optimized for her procedure on the [facility].       Carotid US  IMPRESSION:     1. RIGHT ICA: 50-69% diameter stenosis by peak systolic velocity of  213 cm/s, but no plaque visualized and ICA/CCA ratio less than 2.     2. LEFT ICA:  Normal.      Echo  Interpretation Summary  No structural cause for palpitations was identified.  Global and regional left ventricular function is normal with an EF of 55-60%.  Global right ventricular function is normal. The right ventricle is normal  size.  No significant valvular abnormalities.  The patent foramen ovale was demonstrated by color Doppler.  The estimated PA systolic pressure is 21 mmHg.  IVC diameter <2.1 cm collapsing >50% with sniff suggests a normal RA pressure  of 3 mmHg.  There is no prior study for direct comparison.        CC:  Preoperative exam to assess for increased cardiopulmonary risk while undergoing surgery and anesthesia.    Date of Encounter: 2/8/2022  Primary Care Physician:  Malissa Harvey     Reason for visit:   Encounter Diagnoses   Name Primary?     Pre-op examination Yes     Encounter for elective termination of pregnancy        HPI  Katrina Hillman is a 19 year old female who presents for pre-operative H & P in preparation for  Procedure Information     Case: [id number] Date/Time: 02/09/22 0730    Procedure: DILATION AND CURETTAGE, UTERUS, USING SUCTION (N/A Uterus)    Anesthesia type: Choice    Diagnosis: Encounter for elective termination of pregnancy [Z33.2]    Pre-op diagnosis: Encounter for elective termination of pregnancy [Z33.2]    Location:  OR 07 / [facility] OR    Providers: Jen Cisneros MD      The patient is an 19-year-old woman with a past medical history significant for Pamella-Parkinson-White syndrome seen on Zio patch in 2019, palpitations, dyspnea on exertion, right-sided bruit, former smoker, marijuana user, GERD, history of  UTIs, depression, anxiety and history of substance abuse.  The patient presented to her OB on January 20, 2022 for discussion of pregnancy and termination.  During that visit it was discussed given her cardiac history at that the best location for this would be at the [facility].  She was seen virtually by Dr. Aguero on 2/2/2022 and counseled for the procedure as above.    History is obtained from the patient and chart review    Hx of abnormal bleeding or anti-platelet use: none    Menstrual history: No LMP recorded.: currently pregnant      Past Medical History  Past Medical History:   Diagnosis Date     Abdominal pain     04/24/08,x6 months thought to be secondary to GERD (upper GI with small bowel followthrough scheduled)     Anxiety      Carotid bruit      Constipation     No Comments Provided     Depression      Dyspnea      Encounter for initial prescription of contraceptive pills     4/14/2017     Gastroesophageal reflux disease with esophagitis      High risk heterosexual behavior 04/14/2017    G1 2/22     History of Pamella-Parkinson-White (WPW) syndrome     seen on 2019 zio     Major depressive disorder, single episode     1/15/2016     Nicotine use disorder 07/20/2018     Palpitations      Personal history of urinary tract infection      Pneumonia     2003,1 day hospitalization     Tetrahydrocannabinol (THC) use disorder, mild, abuse 07/20/2018     Tic disorder     07/2012,both motor and verbal       Past Surgical History  Past Surgical History:   Procedure Laterality Date     ESOPHAGOSCOPY, GASTROSCOPY, DUODENOSCOPY (EGD), COMBINED N/A 6/8/2018    Procedure: COMBINED ESOPHAGOSCOPY, GASTROSCOPY, DUODENOSCOPY (EGD), BIOPSY SINGLE OR MULTIPLE;  Gastroscopy;  Surgeon: Sreekanth Shaw MD;  Location: [facility] OR     HERNIA REPAIR       03/17/05,,HERNIA REPAIR,Repair of an epigastric and umbilical       Prior to Admission Medications  Current Outpatient Medications   Medication Sig Dispense  all other ROS negative except as per HPI Refill     Cholecalciferol (VITAMIN D3) 50 MCG ( UT) TABS Take 3 capsules by mouth every morning Take with food        clindamycin-benzoyl peroxide (BENZACLIN) 1-5 % external gel APPLY TOPICALLY TO FACE 1 TIME IN THE MORNING AFTER WASHING. FOLLOW WITH A MOISTURIZER AS NEEDED       Multiple Vitamins-Minerals (ONE-A-DAY WOMENS PO) Take by mouth every morning        Probiotic Product (PROBIOTIC-10 PO) Take by mouth every morning        tretinoin (RETIN-A) 0.05 % external cream APPLY A THIN LAYER TO THE FULL FACE AT BEDTIME AFTER WASHING. MAY MOISTURIZE AFTER IF NEEDED.         Allergies  No Known Allergies    Social History  Social History     Socioeconomic History     Marital status: Single     Spouse name: Not on file     Number of children: 0     Years of education: 12     Highest education level: High school graduate   Occupational History     Not on file   Tobacco Use     Smoking status: Former Smoker     Packs/day: 0.50     Years: 5.00     Pack years: 2.50     Types: Cigarettes     Quit date: 2022     Years since quittin.1     Smokeless tobacco: Never Used   Vaping Use     Vaping Use: Some days     Substances: Nicotine, Flavoring     Devices: Disposable, Refillable tank   Substance and Sexual Activity     Alcohol use: Yes     Comment: sometimes     Drug use: Yes     Types: Marijuana, Other, Methamphetamines     Comment: sober 8 months from pill and meth     Sexual activity: Yes     Partners: Male     Birth control/protection: Condom   Other Topics Concern     Not on file   Social History Narrative    Parents     Attended [employer]    Works at [employer]    America Gimenez Yanado Mother       Godwin Hillman Father       Allyn Sibling born-.      Nancy born 2009  Brother Ivan 2013     Social Determinants of Health     Financial Resource Strain: Not on file   Food Insecurity: Not on file   Transportation Needs: No Transportation Needs     Lack of Transportation (Medical): No     Lack of  Transportation (Non-Medical): No   Physical Activity: Not on file   Stress: No Stress Concern Present     Feeling of Stress : Only a little   Social Connections: Unknown     Frequency of Communication with Friends and Family: More than three times a week     Frequency of Social Gatherings with Friends and Family: Not asked     Attends Quaker Services: Not asked     Active Member of Clubs or Organizations: Not asked     Attends Club or Organization Meetings: Not asked     Marital Status: Never    Intimate Partner Violence: Unknown     Fear of Current or Ex-Partner: Not asked     Emotionally Abused: Not asked     Physically Abused: Not asked     Sexually Abused: Not asked   Housing Stability: Not on file       Family History  Family History   Problem Relation Age of Onset     Cancer Mother         Cancer,Hodgkin's lymphoma as teenager.     Other - See Comments Father         GI Disease,Ulcer, hernia     Bleeding Disorder Sister         Bloody noses     Heart Defect Maternal Grandfather      Other - See Comments Paternal Uncle         tics in childhood     Anesthesia Reaction No family hx of        Review of Systems  The complete review of systems is negative other than noted in the HPI or here.   ROS/MED HX  ENT/Pulmonary:     (+) tobacco use, Past use,  (-) recent URI   Neurologic:  - neg neurologic ROS  (-) no seizures, no CVA and no TIA   Cardiovascular: Comment: Patient was seen by Dr. Shore on 1/6/22 and plans for carotid US due to right sided bruit and echo given GAMA.     Patient has history of WPW seen on 2019 zio patch.     (+) -----GAMA. Irregular Heartbeat/Palpitations, Previous cardiac testing   Echo: Date: Results:    Stress Test: Date: Results:    ECG Reviewed: Date: 1/6/22 Results:  Bradycardia   Poss preexcitation    Cath: Date: Results:      METS/Exercise Tolerance: 4 - Raking leaves, gardening Comment: Patient reports she runs for 1-2 minutes and then gets short of breath. She has been trying  to work out more and hopes that quitting smoking will help her symptoms     Hematologic:  - neg hematologic  ROS  (-) history of blood transfusion   Musculoskeletal:  - neg musculoskeletal ROS     GI/Hepatic:     (+) GERD, Symptomatic,     Renal/Genitourinary:  - neg Renal ROS     Endo:  - neg endo ROS     Psychiatric/Substance Use:     (+) anxiety and depression Recreational drug usage: Cannabis, Meth and Other (Comment) (still using cannabis. Sober from pill and meth for 8 months).    Infectious Disease:  - neg infectious disease ROS     Malignancy:  - neg malignancy ROS     Other:      (+) Possibly pregnant (), ,          Virtual visit -  No vitals were obtained    Physical Exam  Constitutional: Awake, alert, cooperative, no apparent distress, and appears stated age.  Eyes: Pupils equal  HENT: Normocephalic. Nasal bridge and lip piercings  Respiratory: non labored breathing   Neurologic: Awake, alert, oriented to name, place and time.   Neuropsychiatric: Calm, cooperative. Normal affect.      Prior Labs/Diagnostic Studies   All labs and imaging personally reviewed     EKG 22  bradycardia   Poss preexcitation    Zio 2019  Conclusion    Pediatric Cardiac Event Monitor  17 year 8 month old     The patient was monitored for 6 days, 0 hours.  Minimum HR was 39, average HR was 72, maximum HR was 173.  There were 8 triggered events.  During the patient triggered events, the rhythm was sinus rhythm.  There were rare supraventricular ectopic beats, longest consisted of 2 beats.  There were rare ventricular ectopic beats, longest consisted of single beats.  Delta wave is seen.     Impression:  Cardiac event monitor revealing delta wave, consider Pamella-Parkinson-White syndrome.         The patient's records and results personally reviewed by this provider.     Outside records reviewed from: Care Everywhere      Assessment      Katrinajanee Hillman is a 19 year old female was seen as a PAC referral for risk assessment  and optimization for anesthesia.    Plan/Recommendations  Pt will be optimized for the proposed procedure.  See below for details on the assessment, risk, and preoperative recommendations    NEUROLOGY  - No history of TIA, CVA or seizure  -Post Op delirium risk factors:  No risk identified    ENT  - No current airway concerns.  Will need to be reassessed day of surgery.    CARDIAC  - palpitations/ WPW seen on  zio patch. The patient was seen by Dr. Shore on 22 given this history and had EKG on 22 showing sinus bradycardia with possible preexcitation. She was found to have right sided bruit and reported GAMA. Given these symptoms and finding she has been set up for Carotid US and echo later today. Depending on these results patient's location may need to be on [place].   - METS (Metabolic Equivalents)  Patient performs 4 or more METS exercise without symptoms  Total Score: 0      RCRI-Very low risk: Class 1 0.4% complication rate  Total Score: 0        PULMONARY  - Obstructive Sleep Apnea  No current risk of obstructive sleep apnea   ZAIRA Low Risk  Total Score: 0      - Denies asthma or inhaler use  - Tobacco History      History   Smoking Status     Former Smoker     Packs/day: 0.50     Years: 5.00     Types: Cigarettes     Quit date: 2022   Smokeless Tobacco     Never Used   The patient quit smoking 3 weeks ago. She reports she still does smoke marijuana. She was advised not use for 24 hours.     GI  - GERD - associated with pregnancy. Currently symptomatic. Consideration for bicitra DOS.     PONV Medium Risk  Total Score: 2           1 AN PONV: Pt is Female    1 AN PONV: Patient is not a current smoker        /RENAL  - Baseline Creatinine  0.83  ~ Encounter for elective termination of pregnancy - procedure as above. Patient is     ENDOCRINE  - BMI: There is no height or weight on file to calculate BMI.  Healthy Weight (BMI 18.5-24.9)  - No history of Diabetes Mellitus    HEME  VTE Low Risk  0.26%  Total Score: 0      - No history of abnormal bleeding or antiplatelet use.      PSYCH  - Anxiety/depression - patient reports she hasn't been on medications for 1 year and currently doesn't have a therapist she is following with. She feels things are controlled but would like to meet with new therapist in the future.     Patient was discussed with Dr Nuñez    The patient is optimized for their procedure. AVS with information on surgery time/arrival time, meds and NPO status given by nursing staff. No further diagnostic testing indicated.    Will await carotid US and echo results if patient is appropriate UR candidate.     **Please refer to the physical examination documented by the anesthesiologist in the anesthesia record on the day of surgery**        Video-Visit Details    Type of service:  Video Visit    Patient verbally consented to video service today: YES    Video Start Time: 9:05  Video End Time (time video stopped): 9:21    Originating Location (pt. Location): Home    Distant Location (provider location):  [facility]OPERAT[facility]     Mode of Communication:  Video Conference via Ztory  On the day of service:     Prep time: 5 minutes  Visit time: 16 minutes  Documentation time: 29 minutes  ------------------------------------------  Total time: 50 minutes      Sindi Patel PA-C  Preoperative Assessment Center  [facility]  Phone: [phone]  Fax: [phone]

## 2023-08-23 NOTE — ED PROVIDER NOTE - OBJECTIVE STATEMENT
58M hx lumbar spinal fusion (Aug 22) and disc replacement x3 in c spine (Jan 22), presenting s/p fall down 12 stairs at home 1h ago. Reports a pinching sensation in his leg, when it buckled and he subsequently fell. Denies any dizziness, SOB, palpitations prior to fall. During fall, struck head on multiple steps, now endorsing dizziness and HA rated at 7/10. No LOC or vomiting, not on thinners. Main complaint right now is neck and lumbar spine soreness, primarily on the L side. Was on the ground for <5m after the fall, ambulatory after the accident with pain. Spine surgeon is JR Narvaez from the [facility]. 58M hx lumbar spinal fusion (Aug 22) and disc replacement x3 in c spine (Jan 22), presenting s/p fall down 12 stairs at home 1h ago. Reports a pinching sensation in his leg, when it buckled and he subsequently fell. Denies any dizziness, SOB, palpitations prior to fall. During fall, struck head on multiple steps, now endorsing dizziness and HA rated at 7/10. No LOC, AMS or vomiting, not on thinners. Main complaint right now is neck and lumbar spine soreness, primarily on the L side. Was on the ground for <5m after the fall, ambulatory after the accident with pain. No changes in strength or sensation in b/l extremities. Spine surgeon is JR Narvaez from the [facility]. Denying CP, SOB, abd pain, n/v, dysuria, frequency.

## 2023-08-23 NOTE — ED PROVIDER NOTE - PROGRESS NOTE DETAILS
results d/w pt, all questions answered, pt feeling improved, pt ready and stable for DC. -Tito Jackson PA-C

## 2023-08-23 NOTE — ED PROVIDER NOTE - NS ED ATTENDING STATEMENT MOD
This was a shared visit with the JIAN. I reviewed and verified the documentation and independently performed the documented:

## 2023-08-26 LAB
25(OH)D3 SERPL-MCNC: 17.9 NG/ML
ALBUMIN SERPL ELPH-MCNC: 4.2 G/DL
ALP BLD-CCNC: 65 U/L
ALT SERPL-CCNC: 15 U/L
ANION GAP SERPL CALC-SCNC: 14 MMOL/L
APPEARANCE: CLEAR
AST SERPL-CCNC: 13 U/L
BILIRUB SERPL-MCNC: 0.5 MG/DL
BILIRUBIN URINE: ABNORMAL
BLOOD URINE: NEGATIVE
BUN SERPL-MCNC: 12 MG/DL
CALCIUM SERPL-MCNC: 9.3 MG/DL
CHLORIDE SERPL-SCNC: 106 MMOL/L
CHOLEST SERPL-MCNC: 145 MG/DL
CO2 SERPL-SCNC: 26 MMOL/L
COLOR: NORMAL
CREAT SERPL-MCNC: 0.98 MG/DL
EGFR: 90 ML/MIN/1.73M2
ESTIMATED AVERAGE GLUCOSE: 126 MG/DL
GLUCOSE QUALITATIVE U: NEGATIVE MG/DL
GLUCOSE SERPL-MCNC: 151 MG/DL
HBA1C MFR BLD HPLC: 6 %
HDLC SERPL-MCNC: 35 MG/DL
KETONES URINE: ABNORMAL MG/DL
LDLC SERPL CALC-MCNC: 87 MG/DL
LEUKOCYTE ESTERASE URINE: ABNORMAL
NITRITE URINE: NEGATIVE
NONHDLC SERPL-MCNC: 110 MG/DL
PH URINE: 5.5
POTASSIUM SERPL-SCNC: 4.5 MMOL/L
PROT SERPL-MCNC: 6.8 G/DL
PROTEIN URINE: NORMAL MG/DL
PSA SERPL-MCNC: 0.82 NG/ML
SODIUM SERPL-SCNC: 146 MMOL/L
SPECIFIC GRAVITY URINE: >1.03
TRIGL SERPL-MCNC: 132 MG/DL
TSH SERPL-ACNC: 1.63 UIU/ML
UROBILINOGEN URINE: 1 MG/DL
VIT B12 SERPL-MCNC: 362 PG/ML

## 2023-09-27 ENCOUNTER — RX RENEWAL (OUTPATIENT)
Age: 58
End: 2023-09-27

## 2023-12-11 ENCOUNTER — RX RENEWAL (OUTPATIENT)
Age: 58
End: 2023-12-11

## 2023-12-14 ENCOUNTER — RX RENEWAL (OUTPATIENT)
Age: 58
End: 2023-12-14

## 2023-12-14 RX ORDER — OMEPRAZOLE 40 MG/1
40 CAPSULE, DELAYED RELEASE ORAL
Qty: 30 | Refills: 11 | Status: ACTIVE | COMMUNITY
Start: 2023-01-05 | End: 1900-01-01

## 2023-12-14 RX ORDER — SIMVASTATIN 40 MG/1
40 TABLET, FILM COATED ORAL
Qty: 30 | Refills: 11 | Status: ACTIVE | COMMUNITY
Start: 2022-12-20 | End: 1900-01-01

## 2023-12-19 ENCOUNTER — NON-APPOINTMENT (OUTPATIENT)
Age: 58
End: 2023-12-19

## 2023-12-19 ENCOUNTER — APPOINTMENT (OUTPATIENT)
Dept: CARDIOLOGY | Facility: CLINIC | Age: 58
End: 2023-12-19
Payer: MEDICAID

## 2023-12-19 VITALS
BODY MASS INDEX: 36.29 KG/M2 | HEART RATE: 85 BPM | RESPIRATION RATE: 15 BRPM | WEIGHT: 245 LBS | SYSTOLIC BLOOD PRESSURE: 139 MMHG | HEIGHT: 69 IN | OXYGEN SATURATION: 99 % | DIASTOLIC BLOOD PRESSURE: 92 MMHG

## 2023-12-19 VITALS — DIASTOLIC BLOOD PRESSURE: 88 MMHG | SYSTOLIC BLOOD PRESSURE: 140 MMHG

## 2023-12-19 DIAGNOSIS — Z82.3 FAMILY HISTORY OF STROKE: ICD-10-CM

## 2023-12-19 DIAGNOSIS — R94.31 ABNORMAL ELECTROCARDIOGRAM [ECG] [EKG]: ICD-10-CM

## 2023-12-19 PROCEDURE — 93000 ELECTROCARDIOGRAM COMPLETE: CPT

## 2023-12-19 PROCEDURE — 99204 OFFICE O/P NEW MOD 45 MIN: CPT | Mod: 25

## 2024-01-02 ENCOUNTER — RX RENEWAL (OUTPATIENT)
Age: 59
End: 2024-01-02

## 2024-01-02 RX ORDER — AMLODIPINE BESYLATE 5 MG/1
5 TABLET ORAL
Qty: 90 | Refills: 3 | Status: ACTIVE | COMMUNITY
Start: 2022-12-20 | End: 1900-01-01

## 2024-01-25 ENCOUNTER — APPOINTMENT (OUTPATIENT)
Dept: INTERNAL MEDICINE | Facility: CLINIC | Age: 59
End: 2024-01-25
Payer: MEDICAID

## 2024-01-25 VITALS
SYSTOLIC BLOOD PRESSURE: 130 MMHG | DIASTOLIC BLOOD PRESSURE: 78 MMHG | BODY MASS INDEX: 35.99 KG/M2 | TEMPERATURE: 97.2 F | OXYGEN SATURATION: 97 % | HEART RATE: 79 BPM | HEIGHT: 69 IN | WEIGHT: 243 LBS

## 2024-01-25 DIAGNOSIS — Z00.00 ENCOUNTER FOR GENERAL ADULT MEDICAL EXAMINATION W/OUT ABNORMAL FINDINGS: ICD-10-CM

## 2024-01-25 DIAGNOSIS — Z23 ENCOUNTER FOR IMMUNIZATION: ICD-10-CM

## 2024-01-25 DIAGNOSIS — K57.32 DIVERTICULITIS OF LARGE INTESTINE W/OUT PERFORATION OR ABSCESS W/OUT BLEEDING: ICD-10-CM

## 2024-01-25 PROCEDURE — 90686 IIV4 VACC NO PRSV 0.5 ML IM: CPT

## 2024-01-25 PROCEDURE — G0009: CPT

## 2024-01-25 PROCEDURE — 99396 PREV VISIT EST AGE 40-64: CPT | Mod: 25

## 2024-01-25 PROCEDURE — 90677 PCV20 VACCINE IM: CPT

## 2024-01-25 PROCEDURE — 90472 IMMUNIZATION ADMIN EACH ADD: CPT

## 2024-01-25 RX ORDER — CIPROFLOXACIN HYDROCHLORIDE 500 MG/1
500 TABLET, FILM COATED ORAL TWICE DAILY
Qty: 14 | Refills: 0 | Status: ACTIVE | COMMUNITY
Start: 2024-01-25 | End: 1900-01-01

## 2024-01-27 PROBLEM — Z00.00 ANNUAL PHYSICAL EXAM: Status: ACTIVE | Noted: 2022-12-20

## 2024-02-02 LAB
25(OH)D3 SERPL-MCNC: 20.3 NG/ML
ALBUMIN SERPL ELPH-MCNC: 4.2 G/DL
ALP BLD-CCNC: 59 U/L
ALT SERPL-CCNC: 20 U/L
ANION GAP SERPL CALC-SCNC: 12 MMOL/L
APPEARANCE: CLEAR
AST SERPL-CCNC: 15 U/L
BILIRUB SERPL-MCNC: 0.4 MG/DL
BILIRUBIN URINE: NEGATIVE
BLOOD URINE: NEGATIVE
BUN SERPL-MCNC: 12 MG/DL
CALCIUM SERPL-MCNC: 9.4 MG/DL
CHLORIDE SERPL-SCNC: 104 MMOL/L
CHOLEST SERPL-MCNC: 155 MG/DL
CO2 SERPL-SCNC: 24 MMOL/L
COLOR: NORMAL
CREAT SERPL-MCNC: 0.97 MG/DL
EGFR: 90 ML/MIN/1.73M2
ESTIMATED AVERAGE GLUCOSE: 123 MG/DL
GLUCOSE QUALITATIVE U: NEGATIVE MG/DL
GLUCOSE SERPL-MCNC: 106 MG/DL
HBA1C MFR BLD HPLC: 5.9 %
HCT VFR BLD CALC: 46.8 %
HDLC SERPL-MCNC: 37 MG/DL
HGB BLD-MCNC: 15 G/DL
KETONES URINE: NEGATIVE MG/DL
LDLC SERPL CALC-MCNC: 95 MG/DL
LEUKOCYTE ESTERASE URINE: NEGATIVE
MCHC RBC-ENTMCNC: 27.6 PG
MCHC RBC-ENTMCNC: 32.1 GM/DL
MCV RBC AUTO: 86.2 FL
NITRITE URINE: NEGATIVE
NONHDLC SERPL-MCNC: 118 MG/DL
PH URINE: 6
PLATELET # BLD AUTO: 315 K/UL
POTASSIUM SERPL-SCNC: 4.5 MMOL/L
PROT SERPL-MCNC: 7 G/DL
PROTEIN URINE: NEGATIVE MG/DL
PSA SERPL-MCNC: 0.8 NG/ML
RBC # BLD: 5.43 M/UL
RBC # FLD: 14.4 %
SODIUM SERPL-SCNC: 140 MMOL/L
SPECIFIC GRAVITY URINE: 1.02
TRIGL SERPL-MCNC: 125 MG/DL
TSH SERPL-ACNC: 1.96 UIU/ML
UROBILINOGEN URINE: 0.2 MG/DL
VIT B12 SERPL-MCNC: 1103 PG/ML
WBC # FLD AUTO: 7.1 K/UL

## 2024-03-02 ENCOUNTER — RX RENEWAL (OUTPATIENT)
Age: 59
End: 2024-03-02

## 2024-03-06 ENCOUNTER — NON-APPOINTMENT (OUTPATIENT)
Age: 59
End: 2024-03-06

## 2024-03-06 VITALS — BODY MASS INDEX: 35.99 KG/M2 | HEIGHT: 69 IN | WEIGHT: 243 LBS

## 2024-03-06 DIAGNOSIS — F17.210 NICOTINE DEPENDENCE, CIGARETTES, UNCOMPLICATED: ICD-10-CM

## 2024-03-06 NOTE — PLAN
[FreeTextEntry1] : Eligibility confirmed, LDCT scheduled for 3/24/24 at [facility]

## 2024-03-06 NOTE — HISTORY OF PRESENT ILLNESS
[Current] : Current [TextBox_13] : Patient is scheduled for an annual LDCT for lung cancer screening.  Chart review performed to confirm eligibility for LDCT.    No documented personal or family history of lung cancer. No documented s/s of lung cancer. Patient is a current smoker with > 20 pack year history. [PacksperYear] : >20

## 2024-03-24 ENCOUNTER — APPOINTMENT (OUTPATIENT)
Dept: CT IMAGING | Facility: IMAGING CENTER | Age: 59
End: 2024-03-24
Payer: MEDICARE

## 2024-03-24 ENCOUNTER — OUTPATIENT (OUTPATIENT)
Dept: OUTPATIENT SERVICES | Facility: HOSPITAL | Age: 59
LOS: 1 days | End: 2024-03-24
Payer: MEDICARE

## 2024-03-24 DIAGNOSIS — F17.210 NICOTINE DEPENDENCE, CIGARETTES, UNCOMPLICATED: ICD-10-CM

## 2024-03-24 DIAGNOSIS — Z98.1 ARTHRODESIS STATUS: Chronic | ICD-10-CM

## 2024-03-24 DIAGNOSIS — Z98.890 OTHER SPECIFIED POSTPROCEDURAL STATES: Chronic | ICD-10-CM

## 2024-03-24 PROCEDURE — 71271 CT THORAX LUNG CANCER SCR C-: CPT | Mod: 26

## 2024-03-24 PROCEDURE — 71271 CT THORAX LUNG CANCER SCR C-: CPT

## 2024-04-07 ENCOUNTER — NON-APPOINTMENT (OUTPATIENT)
Age: 59
End: 2024-04-07

## 2024-04-22 NOTE — DISCUSSION/SUMMARY
[FreeTextEntry1] : IMPRESSION: Mr. Gerardo is a 58 year old male with a history of hypertension, hyperlipidemia, current smoker and diverticulosis who presents for evaluation of hypertension.   PLAN: 1. Given his abnormal EKG and cardiac risk factors, he will schedule an echocardiogram to evaluate his LV function and a nuclear stress test to evaluate for any ischemia. He had evidence of an anteroseptal infarct on his ECG that was performed today.  2. His blood pressure is slightly elevated today. He will take the Valsartan 320 mg every other day and continue on Amlodipine 5 mg daily. 3. His recent LDL cholesterol was good, thus he will continue on Simvastatin 40 mg daily and a low fat/ low cholesterol diet. 4. We discussed the importance of tobacco cessation. 5. He will follow up after his cardiac testing or sooner if he is symptomatic.  [EKG obtained to assist in diagnosis and management of assessed problem(s)] : EKG obtained to assist in diagnosis and management of assessed problem(s)

## 2024-04-22 NOTE — PHYSICAL EXAM
[Well Developed] : well developed [Well Nourished] : well nourished [No Acute Distress] : no acute distress [Normal Conjunctiva] : normal conjunctiva [Clear Lung Fields] : clear lung fields [Good Air Entry] : good air entry [No Respiratory Distress] : no respiratory distress  [Soft] : abdomen soft [Non Tender] : non-tender [Normal Gait] : normal gait [No Edema] : no edema [No Cyanosis] : no cyanosis [No Clubbing] : no clubbing [No Varicosities] : no varicosities [No Rash] : no rash [No Skin Lesions] : no skin lesions [Moves all extremities] : moves all extremities [No Focal Deficits] : no focal deficits [Normal Speech] : normal speech [Alert and Oriented] : alert and oriented [Normal memory] : normal memory [Normal Rate] : normal [Rhythm Regular] : regular [Normal S1] : normal S1 [Normal S2] : normal S2 [No Murmur] : no murmurs heard [No Pitting Edema] : no pitting edema present [Normal Venous Pressure] : normal venous pressure [S3] : no S3 [Right Carotid Bruit] : no bruit heard over the right carotid [Left Carotid Bruit] : no bruit heard over the left carotid [Bruit] : no bruit heard [Normal Bowel Sounds] : normal bowel sounds [de-identified] : No oral pallor.  [de-identified] : No visible skin ulcers.

## 2024-04-22 NOTE — CARDIOLOGY SUMMARY
[de-identified] : 12/19/2023: Sinus Rhythm at 83 bpm with left anterior fascicular block and an old anteroseptal infarct

## 2024-04-22 NOTE — HISTORY OF PRESENT ILLNESS
[FreeTextEntry1] : Patient is a 58 year old male with a history of hypertension, hyperlipidemia, current smoker, and diverticulosis who presents for evaluation of hypertension. He takes Valsartan about once a week. He states he checks his BP at home and it is 126-135 mmHg systolic. He has been feeling well, he denies any chest pain, shortness of breath, palpitations, headaches or dizziness. He feels pain on his upper left shoulder when he lays down. In January 2022 he had cervical and lumbar spinal surgery after an accident at work.

## 2024-04-23 ENCOUNTER — NON-APPOINTMENT (OUTPATIENT)
Age: 59
End: 2024-04-23

## 2024-04-23 ENCOUNTER — APPOINTMENT (OUTPATIENT)
Dept: CARDIOLOGY | Facility: CLINIC | Age: 59
End: 2024-04-23
Payer: MEDICARE

## 2024-04-23 VITALS
HEART RATE: 97 BPM | SYSTOLIC BLOOD PRESSURE: 121 MMHG | HEIGHT: 69 IN | WEIGHT: 243 LBS | BODY MASS INDEX: 35.99 KG/M2 | OXYGEN SATURATION: 95 % | RESPIRATION RATE: 12 BRPM | DIASTOLIC BLOOD PRESSURE: 81 MMHG

## 2024-04-23 PROCEDURE — 93306 TTE W/DOPPLER COMPLETE: CPT

## 2024-04-23 PROCEDURE — G2211 COMPLEX E/M VISIT ADD ON: CPT | Mod: NC,1L

## 2024-04-23 PROCEDURE — 99214 OFFICE O/P EST MOD 30 MIN: CPT | Mod: 25

## 2024-04-23 PROCEDURE — 93000 ELECTROCARDIOGRAM COMPLETE: CPT

## 2024-04-23 NOTE — DISCUSSION/SUMMARY
[FreeTextEntry1] : IMPRESSION: Mr. Gerardo is a 59 year old male with a history of hypertension, hyperlipidemia, current smoker and diverticulosis who presents for follow up of hypertension and an abnormal ECG.   PLAN: 1. He had an echocardiogram done earlier today that showed normal LV function with hypokinesis of susan basal inferior wall.  He has been scheduled for a nuclear stress test next month to evaluate for any ischemia given this abnormality as well as his abnormal ECG that shows an anteroseptal infarct.  2. His blood pressure is good, he will continue on Valsartan 320 mg every other day and continue on Amlodipine 5 mg daily.  We discussed the importance of diet modification and tobacco cessation. 3. His recent LDL cholesterol from 3 months ago was fine, thus he will continue on Simvastatin 40 mg daily and a low fat/ low cholesterol diet. 4. He had evidence of a mildly dilated aorta on his echocardiogram.  We discussed the importance of good blood pressure control as well as tobacco cessation.  He should have a repeat echocardiogram in 1 year to follow-up his dilated aorta. 5. He will follow up with me in 6 months, or sooner if he is symptomatic or should there be any abnormalities on his nuclear stress test.  [EKG obtained to assist in diagnosis and management of assessed problem(s)] : EKG obtained to assist in diagnosis and management of assessed problem(s)

## 2024-04-23 NOTE — CARDIOLOGY SUMMARY
[de-identified] : 4/23/2024: Sinus Rhythm at 75 bpm with left anterior fascicular block and poor R wave progression, consider an old anteroseptal infarct [de-identified] : 4/23/2024: Grossly preserved left ventricular ejection fraction with hypokinesis of the basal inferior wall. EF is approximately 60-65%. Concentric left ventricular remodeling. There is mild (grade 1) left ventricular diastolic dysfunction. Mildly enlarged right ventricular cavity size and normal systolic function. Mild biatrial enlargement. Mild mitral regurgitation. Mild mitral valve stenosis. Mitral annular calcification and calcified mitral valve leaflets with decreased opening. No echocardiographic evidence of pulmonary hypertension. Mild tricuspid regurgitation. Estimated pulmonary artery systolic pressure is 31 mmHg. Aortic root at the sinuses of Valsalva is mildly dilated, measuring 4.20 cm. Trileaflet aortic valve with normal systolic excursion. There is calcification of the aortic valve leaflets. Trace aortic regurgitation. Trace pericardial effusion.

## 2024-04-23 NOTE — PHYSICAL EXAM
[Well Developed] : well developed [Well Nourished] : well nourished [No Acute Distress] : no acute distress [Normal Conjunctiva] : normal conjunctiva [Normal Rate] : normal [Rhythm Regular] : regular [Normal S1] : normal S1 [Normal S2] : normal S2 [No Murmur] : no murmurs heard [No Pitting Edema] : no pitting edema present [Clear Lung Fields] : clear lung fields [Good Air Entry] : good air entry [No Respiratory Distress] : no respiratory distress  [Soft] : abdomen soft [Non Tender] : non-tender [Normal Gait] : normal gait [No Edema] : no edema [No Cyanosis] : no cyanosis [No Clubbing] : no clubbing [No Varicosities] : no varicosities [No Rash] : no rash [No Skin Lesions] : no skin lesions [Moves all extremities] : moves all extremities [No Focal Deficits] : no focal deficits [Normal Speech] : normal speech [Alert and Oriented] : alert and oriented [Normal memory] : normal memory [Normal Venous Pressure] : normal venous pressure [Right Carotid Bruit] : no bruit heard over the right carotid [Left Carotid Bruit] : no bruit heard over the left carotid [Bruit] : no bruit heard [Normal Bowel Sounds] : normal bowel sounds [de-identified] : Extraocular muscles intact. Anicteric sclerae. [de-identified] : No oral pallor. [de-identified] : No visible skin ulcers.

## 2024-04-23 NOTE — HISTORY OF PRESENT ILLNESS
[FreeTextEntry1] : Patient is a 59 year old male with a history of hypertension, hyperlipidemia, current smoker and diverticulosis who presents for follow up of hypertension. He has been taking Amlodipine 5 mg daily and Valsartan 320 mg every other day or every 3 days (he measures his BP at home and takes the Valsartan if his blood pressure is elevated). He has been feeling well, he denies any chest pain, shortness of breath, palpitations, headaches or dizziness.

## 2024-05-22 ENCOUNTER — APPOINTMENT (OUTPATIENT)
Dept: CARDIOLOGY | Facility: CLINIC | Age: 59
End: 2024-05-22
Payer: MEDICARE

## 2024-05-22 PROCEDURE — 78452 HT MUSCLE IMAGE SPECT MULT: CPT

## 2024-05-22 PROCEDURE — 93015 CV STRESS TEST SUPVJ I&R: CPT

## 2024-05-22 PROCEDURE — A9500: CPT

## 2024-05-23 ENCOUNTER — APPOINTMENT (OUTPATIENT)
Dept: INTERNAL MEDICINE | Facility: CLINIC | Age: 59
End: 2024-05-23
Payer: MEDICARE

## 2024-05-23 VITALS
BODY MASS INDEX: 35.55 KG/M2 | TEMPERATURE: 97.8 F | HEART RATE: 81 BPM | OXYGEN SATURATION: 98 % | SYSTOLIC BLOOD PRESSURE: 138 MMHG | HEIGHT: 69 IN | WEIGHT: 240 LBS | DIASTOLIC BLOOD PRESSURE: 82 MMHG

## 2024-05-23 DIAGNOSIS — I10 ESSENTIAL (PRIMARY) HYPERTENSION: ICD-10-CM

## 2024-05-23 DIAGNOSIS — F17.210 NICOTINE DEPENDENCE, CIGARETTES, UNCOMPLICATED: ICD-10-CM

## 2024-05-23 DIAGNOSIS — E78.5 HYPERLIPIDEMIA, UNSPECIFIED: ICD-10-CM

## 2024-05-23 PROCEDURE — 99214 OFFICE O/P EST MOD 30 MIN: CPT | Mod: 25

## 2024-05-23 PROCEDURE — 99406 BEHAV CHNG SMOKING 3-10 MIN: CPT

## 2024-05-23 RX ORDER — NICOTINE 21 MG/24HR
21 PATCH, TRANSDERMAL 24 HOURS TRANSDERMAL DAILY
Qty: 1 | Refills: 2 | Status: ACTIVE | COMMUNITY
Start: 2024-05-23 | End: 1900-01-01

## 2024-05-23 RX ORDER — METRONIDAZOLE 500 MG/1
500 TABLET ORAL 3 TIMES DAILY
Qty: 21 | Refills: 0 | Status: ACTIVE | COMMUNITY
Start: 2024-01-25 | End: 1900-01-01

## 2024-05-23 RX ORDER — CIPROFLOXACIN HYDROCHLORIDE 500 MG/1
500 TABLET, FILM COATED ORAL TWICE DAILY
Qty: 14 | Refills: 0 | Status: ACTIVE | COMMUNITY
Start: 2023-08-21 | End: 1900-01-01

## 2024-05-23 NOTE — HISTORY OF PRESENT ILLNESS
[FreeTextEntry1] : Pt is present today for a follow-up visit.  [de-identified] : Pt is a 59 yr old male present today for a follow-up visit.   Patient is doing well overall and has no acute concerns. Patient reports everything is stable. Denies any CP, chest tightness or SOB. Had stress test done yesterday Denies any abdominal pain, urinary symptom, or change in bowel habits. Denies any fever, chills, or night sweats.

## 2024-05-23 NOTE — ADDENDUM
[FreeTextEntry1] : I, Nisreen Rodriguez, acted as a scribe on behalf of Dr. José Miguel Barber MD, on 05/23/2024.   All medical entries made by the scribe were at my, Dr. José Miguel Barber MD, direction and personally dictated by me on 05/23/2024. I have reviewed the chart and agree that the record accurately reflects my personal performance of the history, physical exam, assessment and plan. I have also personally directed, reviewed, and agreed with the chart.

## 2024-05-23 NOTE — HEALTH RISK ASSESSMENT
[No] : In the past 12 months have you used drugs other than those required for medical reasons? No [Current] : Current [de-identified] : PULM, CARD

## 2024-05-23 NOTE — ASSESSMENT
[FreeTextEntry1] : Follow-up Visit: Nicotine dependence w/o complications, Diverticulitis of colon - BP is stable. Continue current management. - Check A1c. Lipid panel, vitamin levels, and urine analysis, TSH.  - Rx Ciprofloxacin 500mg 2x/daily, Metronidazole 500mg 3x/daily,-given any bout of diverticulitis Nicotine 21mg/daily   - RTO as needed.    Pt verbalized understanding and will reach should any questions/concerns occur.

## 2024-05-28 DIAGNOSIS — R94.39 ABNORMAL RESULT OF OTHER CARDIOVASCULAR FUNCTION STUDY: ICD-10-CM

## 2024-05-30 ENCOUNTER — NON-APPOINTMENT (OUTPATIENT)
Age: 59
End: 2024-05-30

## 2024-05-30 LAB
25(OH)D3 SERPL-MCNC: 27.5 NG/ML
ALBUMIN SERPL ELPH-MCNC: 4.3 G/DL
ALP BLD-CCNC: 56 U/L
ALT SERPL-CCNC: 15 U/L
ANION GAP SERPL CALC-SCNC: 14 MMOL/L
APPEARANCE: CLEAR
AST SERPL-CCNC: 12 U/L
BASOPHILS # BLD AUTO: 0.04 K/UL
BASOPHILS NFR BLD AUTO: 0.6 %
BILIRUB SERPL-MCNC: 0.4 MG/DL
BILIRUBIN URINE: NEGATIVE
BLOOD URINE: NEGATIVE
BUN SERPL-MCNC: 13 MG/DL
CALCIUM SERPL-MCNC: 9.2 MG/DL
CHLORIDE SERPL-SCNC: 105 MMOL/L
CHOLEST SERPL-MCNC: 194 MG/DL
CO2 SERPL-SCNC: 23 MMOL/L
COLOR: NORMAL
CREAT SERPL-MCNC: 0.99 MG/DL
EGFR: 88 ML/MIN/1.73M2
EOSINOPHIL # BLD AUTO: 0.22 K/UL
EOSINOPHIL NFR BLD AUTO: 3.1 %
ESTIMATED AVERAGE GLUCOSE: 103 MG/DL
GLUCOSE QUALITATIVE U: NEGATIVE MG/DL
GLUCOSE SERPL-MCNC: 125 MG/DL
HBA1C MFR BLD HPLC: 5.2 %
HCT VFR BLD CALC: 47.2 %
HDLC SERPL-MCNC: 38 MG/DL
HGB BLD-MCNC: 15.2 G/DL
IMM GRANULOCYTES NFR BLD AUTO: 0.1 %
KETONES URINE: ABNORMAL MG/DL
LDLC SERPL CALC-MCNC: 133 MG/DL
LEUKOCYTE ESTERASE URINE: NEGATIVE
LYMPHOCYTES # BLD AUTO: 2.02 K/UL
LYMPHOCYTES NFR BLD AUTO: 28.2 %
MAN DIFF?: NORMAL
MCHC RBC-ENTMCNC: 27.9 PG
MCHC RBC-ENTMCNC: 32.2 GM/DL
MCV RBC AUTO: 86.6 FL
MONOCYTES # BLD AUTO: 0.39 K/UL
MONOCYTES NFR BLD AUTO: 5.4 %
NEUTROPHILS # BLD AUTO: 4.49 K/UL
NEUTROPHILS NFR BLD AUTO: 62.6 %
NITRITE URINE: NEGATIVE
NONHDLC SERPL-MCNC: 155 MG/DL
PH URINE: 5.5
PLATELET # BLD AUTO: 267 K/UL
POTASSIUM SERPL-SCNC: 4.4 MMOL/L
PROT SERPL-MCNC: 7 G/DL
PROTEIN URINE: NORMAL MG/DL
PSA SERPL-MCNC: 0.66 NG/ML
RBC # BLD: 5.45 M/UL
RBC # FLD: 15.3 %
SODIUM SERPL-SCNC: 142 MMOL/L
SPECIFIC GRAVITY URINE: 1.03
TRIGL SERPL-MCNC: 124 MG/DL
TSH SERPL-ACNC: 1.82 UIU/ML
UROBILINOGEN URINE: 0.2 MG/DL
VIT B12 SERPL-MCNC: 618 PG/ML
WBC # FLD AUTO: 7.17 K/UL

## 2024-06-02 ENCOUNTER — RX RENEWAL (OUTPATIENT)
Age: 59
End: 2024-06-02

## 2024-06-02 RX ORDER — VALSARTAN 320 MG/1
320 TABLET, COATED ORAL
Qty: 90 | Refills: 0 | Status: ACTIVE | COMMUNITY
Start: 2022-12-20 | End: 1900-01-01

## 2024-06-20 ENCOUNTER — OUTPATIENT (OUTPATIENT)
Dept: OUTPATIENT SERVICES | Facility: HOSPITAL | Age: 59
LOS: 1 days | End: 2024-06-20
Payer: MEDICARE

## 2024-06-20 ENCOUNTER — APPOINTMENT (OUTPATIENT)
Dept: CT IMAGING | Facility: CLINIC | Age: 59
End: 2024-06-20
Payer: MEDICARE

## 2024-06-20 DIAGNOSIS — R94.39 ABNORMAL RESULT OF OTHER CARDIOVASCULAR FUNCTION STUDY: ICD-10-CM

## 2024-06-20 DIAGNOSIS — Z98.890 OTHER SPECIFIED POSTPROCEDURAL STATES: Chronic | ICD-10-CM

## 2024-06-20 DIAGNOSIS — Z98.1 ARTHRODESIS STATUS: Chronic | ICD-10-CM

## 2024-06-20 PROCEDURE — 75574 CT ANGIO HRT W/3D IMAGE: CPT

## 2024-06-20 PROCEDURE — 75574 CT ANGIO HRT W/3D IMAGE: CPT | Mod: 26

## 2024-06-24 ENCOUNTER — APPOINTMENT (OUTPATIENT)
Dept: CARDIOLOGY | Facility: CLINIC | Age: 59
End: 2024-06-24
Payer: MEDICARE

## 2024-06-24 PROCEDURE — 99214 OFFICE O/P EST MOD 30 MIN: CPT

## 2024-06-24 PROCEDURE — G2211 COMPLEX E/M VISIT ADD ON: CPT | Mod: 93

## 2024-06-24 RX ORDER — EZETIMIBE 10 MG/1
10 TABLET ORAL
Qty: 90 | Refills: 1 | Status: ACTIVE | COMMUNITY
Start: 2024-06-24 | End: 1900-01-01

## 2024-10-01 NOTE — CARDIOLOGY SUMMARY
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 10 METS, consistent with average exercise capacity. Normal heart rate response. Hypertensive blood pressure response. There are medium-sized, mild to moderate-sized defects of the basal inferoseptal, inferior, inferoapical, basal lalteral, and basal inferolateral walls that are predominantly fixed and partly corrected with prone imaging, consistent with scar of the basal inferoseptal, basal to mid inferior, and inferoapical walls. LVEF= 69%, revealing overall preserved left ventricular ejection fraction with hypokinesis of the basal inferoseptal, basal inferolateral, and basal inferior walls, and mild hypokinesis of the inferoapical and mid inferior walls. [de-identified] : 4/23/2024: Grossly preserved left ventricular ejection fraction with hypokinesis of the basal inferior wall. EF is approximately 60-65%. Concentric left ventricular remodeling. There is mild (grade 1) left ventricular diastolic dysfunction. Mildly enlarged right ventricular cavity size and normal systolic function. Mild biatrial enlargement. Mild mitral regurgitation. Mild mitral valve stenosis. Mitral annular calcification and calcified mitral valve leaflets with decreased opening. No echocardiographic evidence of pulmonary hypertension. Mild tricuspid regurgitation. Estimated pulmonary artery systolic pressure is 31 mmHg. Aortic root at the sinuses of Valsalva is mildly dilated, measuring 4.20 cm. Trileaflet aortic valve with normal systolic excursion. There is calcification of the aortic valve leaflets. Trace aortic regurgitation. Trace pericardial effusion. [de-identified] : Cardiac CT performed on 6/20/2024: The calculated Agatston score is 149. LEFT MAIN: Focal mixed calcified and noncalcified plaque results in less than 25% luminal narrowing.  PROXIOMAL LEFT ANTERIOR DESCENDING: Scattered calcified plaque without luminal narrowing.

## 2024-10-01 NOTE — HISTORY OF PRESENT ILLNESS
[Home] : at home, [unfilled] , at the time of the visit. [Medical Office: ([facility])___] : at the medical office located in  [Verbal consent obtained from patient] : the patient, [unfilled] [FreeTextEntry1] : Patient is a 59 year old male with a history of hypertension, hyperlipidemia, coronary atherosclerosis, current smoker and diverticulosis who is evaluated today via Telehealth to discuss the results of his Cardiac CT. He states that his diet has been suboptimal.  He mentions 1 episode of chest discomfort while lying down a few days ago, however, he denies any exertional chest pain, dyspnea, palpitations, headaches, or dizziness.

## 2024-10-01 NOTE — DISCUSSION/SUMMARY
[FreeTextEntry1] : IMPRESSION: Mr. Gerardo is a 59 year old male with a history of hypertension, hyperlipidemia, coronary atherosclerosis, current smoker and diverticulosis who is evaluated today via Telehealth to discuss the results of his Cardiac CT.    PLAN: 1. He had a Cardiac CT that revealed a moderately elevated calcium score with minimal luminal narrowing of the left main.  We discussed the importance of good control of his cholesterol and tobacco cessation.  He does admit to having a suboptimal diet.  His goal LDL is less than 70 given his moderately elevated calcium score.  He has agreed to start on Zetia 10 mg daily and continue on his current dose of simvastatin 40 mg daily.  He will modify his diet as well.  2. He states that his blood pressure has been good, thus he will continue on Valsartan 320 mg every other day and Amlodipine 5 mg daily.  We discussed the importance of diet modification and tobacco cessation. 3. He had evidence of a mildly dilated aorta on his echocardiogram.  We discussed the importance of good blood pressure control as well as tobacco cessation.  He should have a repeat echocardiogram in 1 year to follow-up his dilated aorta. 4. He will follow up with me in 3-4 months, or sooner if he is symptomatic.   Time started- 2:28 PM (patient arrived himself at 2:50 PM). Time ended (including documentation- 3:06 PM

## 2024-10-02 ENCOUNTER — APPOINTMENT (OUTPATIENT)
Dept: CARDIOLOGY | Facility: CLINIC | Age: 59
End: 2024-10-02

## 2024-10-03 ENCOUNTER — NON-APPOINTMENT (OUTPATIENT)
Age: 59
End: 2024-10-03

## 2024-10-03 ENCOUNTER — APPOINTMENT (OUTPATIENT)
Dept: CARDIOLOGY | Facility: CLINIC | Age: 59
End: 2024-10-03
Payer: MEDICARE

## 2024-10-03 VITALS
HEIGHT: 69 IN | WEIGHT: 248 LBS | SYSTOLIC BLOOD PRESSURE: 125 MMHG | RESPIRATION RATE: 22 BRPM | HEART RATE: 81 BPM | OXYGEN SATURATION: 96 % | BODY MASS INDEX: 36.73 KG/M2 | DIASTOLIC BLOOD PRESSURE: 84 MMHG

## 2024-10-03 DIAGNOSIS — E78.5 HYPERLIPIDEMIA, UNSPECIFIED: ICD-10-CM

## 2024-10-03 PROCEDURE — 93000 ELECTROCARDIOGRAM COMPLETE: CPT

## 2024-10-03 PROCEDURE — G2211 COMPLEX E/M VISIT ADD ON: CPT

## 2024-10-03 PROCEDURE — 99214 OFFICE O/P EST MOD 30 MIN: CPT

## 2024-10-03 NOTE — DISCUSSION/SUMMARY
[EKG obtained to assist in diagnosis and management of assessed problem(s)] : EKG obtained to assist in diagnosis and management of assessed problem(s) [FreeTextEntry1] : IMPRESSION: Mr. Gerardo is a 59 year old male with a history of hypertension, hyperlipidemia, coronary atherosclerosis, current smoker, coronary atherosclerosis, and diverticulosis who presents today for follow up of HTN and hyperlipidemia.    PLAN: 1. He had a Cardiac CT that revealed a moderately elevated calcium score with minimal luminal narrowing of the left main.  We discussed the importance of good control of his cholesterol and tobacco cessation.  His goal LDL is less than 70 given his moderately elevated calcium score.  He will continue on Zetia 10 mg daily and simvastatin 40 mg daily.  He will modify his diet as well. I will be checking a CMP and lipid profile today.   2. His blood pressure is good, thus he will continue on Valsartan 320 mg every other day and Amlodipine 5 mg daily.  We discussed the importance of diet modification and tobacco cessation. 3. He had evidence of a mildly dilated aorta on his echocardiogram.  We discussed the importance of good blood pressure control as well as tobacco cessation.  He should have a repeat echocardiogram in 6 months to follow-up his dilated aorta. 4. He will follow up with me in 3 months, or sooner if he is symptomatic.

## 2024-10-03 NOTE — CARDIOLOGY SUMMARY
[de-identified] : 10/3/2024:Sinus Rhythm at 83 beats per minute with old anteroseptal infarct, low voltage QRS, and left anterior fascicular block [de-identified] : Exercise Nuclear Stress Test test performed on 5/22/2024: No significant ischemic ST segment changes beyond baseline abnormalities. 10 METS, consistent with average exercise capacity. Normal heart rate response. Hypertensive blood pressure response. There are medium-sized, mild to moderate-sized defects of the basal inferoseptal, inferior, inferoapical, basal lalteral, and basal inferolateral walls that are predominantly fixed and partly corrected with prone imaging, consistent with scar of the basal inferoseptal, basal to mid inferior, and inferoapical walls. LVEF= 69%, revealing overall preserved left ventricular ejection fraction with hypokinesis of the basal inferoseptal, basal inferolateral, and basal inferior walls, and mild hypokinesis of the inferoapical and mid inferior walls. [de-identified] : 4/23/2024: Grossly preserved left ventricular ejection fraction with hypokinesis of the basal inferior wall. EF is approximately 60-65%. Concentric left ventricular remodeling. There is mild (grade 1) left ventricular diastolic dysfunction. Mildly enlarged right ventricular cavity size and normal systolic function. Mild biatrial enlargement. Mild mitral regurgitation. Mild mitral valve stenosis. Mitral annular calcification and calcified mitral valve leaflets with decreased opening. No echocardiographic evidence of pulmonary hypertension. Mild tricuspid regurgitation. Estimated pulmonary artery systolic pressure is 31 mmHg. Aortic root at the sinuses of Valsalva is mildly dilated, measuring 4.20 cm. Trileaflet aortic valve with normal systolic excursion. There is calcification of the aortic valve leaflets. Trace aortic regurgitation. Trace pericardial effusion. [de-identified] : Cardiac CT performed on 6/20/2024: The calculated Agatston score is 149. LEFT MAIN: Focal mixed calcified and noncalcified plaque results in less than 25% luminal narrowing.  PROXIOMAL LEFT ANTERIOR DESCENDING: Scattered calcified plaque without luminal narrowing.

## 2024-10-03 NOTE — PHYSICAL EXAM
[Well Developed] : well developed [Well Nourished] : well nourished [No Acute Distress] : no acute distress [Normal Conjunctiva] : normal conjunctiva [Normal Venous Pressure] : normal venous pressure [Normal Rate] : normal [Rhythm Regular] : regular [Normal S1] : normal S1 [Normal S2] : normal S2 [No Murmur] : no murmurs heard [No Pitting Edema] : no pitting edema present [Clear Lung Fields] : clear lung fields [Good Air Entry] : good air entry [No Respiratory Distress] : no respiratory distress  [Soft] : abdomen soft [Non Tender] : non-tender [Normal Bowel Sounds] : normal bowel sounds [Normal Gait] : normal gait [No Edema] : no edema [No Cyanosis] : no cyanosis [No Rash] : no rash [Moves all extremities] : moves all extremities [No Focal Deficits] : no focal deficits [Normal Speech] : normal speech [Alert and Oriented] : alert and oriented [Normal memory] : normal memory [Right Carotid Bruit] : no bruit heard over the right carotid [Left Carotid Bruit] : no bruit heard over the left carotid [Bruit] : no bruit heard [de-identified] : Extraocular muscles intact. Anicteric sclerae. [de-identified] : No oral pallor. [de-identified] : No visible skin ulcers.

## 2024-10-03 NOTE — REASON FOR VISIT
GOAL: Patient will improve static and dynamic standing balance by 1/2 grade using least restrictive device within 2 weeks to improve safety and decrease risk of falls.
[FreeTextEntry3] : Dr. Barber

## 2024-10-03 NOTE — HISTORY OF PRESENT ILLNESS
[FreeTextEntry1] : Patient is a 59 year old male with a history of hypertension, hyperlipidemia, coronary atherosclerosis, coronary atherosclerosis, current smoker and diverticulosis who presents today for follow up of HTN and hyperlipidemia. He has been very upset as his brother passed away last week. He states that he gets tired with exertion, which he attributes to weight gain. He otherwise denies any exertional chest pain, dyspnea at rest, palpitations, headaches, or dizziness.

## 2024-10-07 LAB
ALBUMIN SERPL ELPH-MCNC: 4.1 G/DL
ALP BLD-CCNC: 50 U/L
ALT SERPL-CCNC: 18 U/L
ANION GAP SERPL CALC-SCNC: 16 MMOL/L
AST SERPL-CCNC: 17 U/L
BILIRUB SERPL-MCNC: 0.3 MG/DL
BUN SERPL-MCNC: 12 MG/DL
CALCIUM SERPL-MCNC: 9.2 MG/DL
CHLORIDE SERPL-SCNC: 104 MMOL/L
CHOLEST SERPL-MCNC: 113 MG/DL
CO2 SERPL-SCNC: 21 MMOL/L
CREAT SERPL-MCNC: 1.16 MG/DL
EGFR: 73 ML/MIN/1.73M2
GLUCOSE SERPL-MCNC: 101 MG/DL
HDLC SERPL-MCNC: 36 MG/DL
LDLC SERPL CALC-MCNC: 60 MG/DL
NONHDLC SERPL-MCNC: 77 MG/DL
POTASSIUM SERPL-SCNC: 4.5 MMOL/L
PROT SERPL-MCNC: 7.1 G/DL
SODIUM SERPL-SCNC: 141 MMOL/L
TRIGL SERPL-MCNC: 83 MG/DL

## 2024-11-18 ENCOUNTER — RX RENEWAL (OUTPATIENT)
Age: 59
End: 2024-11-18

## 2024-12-16 ENCOUNTER — RX RENEWAL (OUTPATIENT)
Age: 59
End: 2024-12-16

## 2025-02-11 ENCOUNTER — NON-APPOINTMENT (OUTPATIENT)
Age: 60
End: 2025-02-11

## 2025-02-11 ENCOUNTER — APPOINTMENT (OUTPATIENT)
Dept: CARDIOLOGY | Facility: CLINIC | Age: 60
End: 2025-02-11

## 2025-02-11 VITALS
DIASTOLIC BLOOD PRESSURE: 78 MMHG | SYSTOLIC BLOOD PRESSURE: 116 MMHG | HEART RATE: 93 BPM | BODY MASS INDEX: 38.51 KG/M2 | WEIGHT: 260 LBS | HEIGHT: 69 IN | RESPIRATION RATE: 12 BRPM | OXYGEN SATURATION: 96 %

## 2025-02-11 DIAGNOSIS — I77.819 AORTIC ECTASIA, UNSPECIFIED SITE: ICD-10-CM

## 2025-02-11 DIAGNOSIS — E78.5 HYPERLIPIDEMIA, UNSPECIFIED: ICD-10-CM

## 2025-02-11 PROCEDURE — 99214 OFFICE O/P EST MOD 30 MIN: CPT | Mod: 25

## 2025-02-11 PROCEDURE — 93000 ELECTROCARDIOGRAM COMPLETE: CPT

## 2025-02-28 ENCOUNTER — LABORATORY RESULT (OUTPATIENT)
Age: 60
End: 2025-02-28

## 2025-02-28 ENCOUNTER — APPOINTMENT (OUTPATIENT)
Dept: INTERNAL MEDICINE | Facility: CLINIC | Age: 60
End: 2025-02-28
Payer: MEDICARE

## 2025-02-28 VITALS
BODY MASS INDEX: 38.66 KG/M2 | HEART RATE: 75 BPM | TEMPERATURE: 98 F | WEIGHT: 261 LBS | OXYGEN SATURATION: 97 % | SYSTOLIC BLOOD PRESSURE: 134 MMHG | DIASTOLIC BLOOD PRESSURE: 81 MMHG | HEIGHT: 69 IN

## 2025-02-28 DIAGNOSIS — F17.210 NICOTINE DEPENDENCE, CIGARETTES, UNCOMPLICATED: ICD-10-CM

## 2025-02-28 DIAGNOSIS — Z00.00 ENCOUNTER FOR GENERAL ADULT MEDICAL EXAMINATION W/OUT ABNORMAL FINDINGS: ICD-10-CM

## 2025-02-28 PROCEDURE — G0439: CPT

## 2025-02-28 PROCEDURE — G0296 VISIT TO DETERM LDCT ELIG: CPT

## 2025-03-07 ENCOUNTER — NON-APPOINTMENT (OUTPATIENT)
Age: 60
End: 2025-03-07

## 2025-03-07 DIAGNOSIS — R79.89 OTHER SPECIFIED ABNORMAL FINDINGS OF BLOOD CHEMISTRY: ICD-10-CM

## 2025-03-07 DIAGNOSIS — E66.9 OBESITY, UNSPECIFIED: ICD-10-CM

## 2025-03-07 LAB
25(OH)D3 SERPL-MCNC: 12.7 NG/ML
ALBUMIN SERPL ELPH-MCNC: 4.3 G/DL
ALP BLD-CCNC: 56 U/L
ALT SERPL-CCNC: 16 U/L
ANION GAP SERPL CALC-SCNC: 12 MMOL/L
APPEARANCE: CLEAR
AST SERPL-CCNC: 15 U/L
BASOPHILS # BLD AUTO: 0.03 K/UL
BASOPHILS NFR BLD AUTO: 0.4 %
BILIRUB SERPL-MCNC: 0.5 MG/DL
BILIRUBIN URINE: ABNORMAL
BLOOD URINE: NEGATIVE
BUN SERPL-MCNC: 10 MG/DL
CALCIUM SERPL-MCNC: 8.7 MG/DL
CHLORIDE SERPL-SCNC: 108 MMOL/L
CHOLEST SERPL-MCNC: 115 MG/DL
CO2 SERPL-SCNC: 25 MMOL/L
COLOR: NORMAL
CREAT SERPL-MCNC: 0.9 MG/DL
EGFR: 98 ML/MIN/1.73M2
EOSINOPHIL # BLD AUTO: 0.19 K/UL
EOSINOPHIL NFR BLD AUTO: 2.5 %
ESTIMATED AVERAGE GLUCOSE: 128 MG/DL
GLUCOSE QUALITATIVE U: NEGATIVE MG/DL
GLUCOSE SERPL-MCNC: 115 MG/DL
HBA1C MFR BLD HPLC: 6.1 %
HCT VFR BLD CALC: 45.1 %
HDLC SERPL-MCNC: 31 MG/DL
HGB BLD-MCNC: 14.3 G/DL
IMM GRANULOCYTES NFR BLD AUTO: 0.1 %
KETONES URINE: NEGATIVE MG/DL
LDLC SERPL CALC-MCNC: 58 MG/DL
LEUKOCYTE ESTERASE URINE: ABNORMAL
LYMPHOCYTES # BLD AUTO: 2.51 K/UL
LYMPHOCYTES NFR BLD AUTO: 32.6 %
MAN DIFF?: NORMAL
MCHC RBC-ENTMCNC: 27.2 PG
MCHC RBC-ENTMCNC: 31.7 G/DL
MCV RBC AUTO: 85.9 FL
MONOCYTES # BLD AUTO: 0.49 K/UL
MONOCYTES NFR BLD AUTO: 6.4 %
NEUTROPHILS # BLD AUTO: 4.48 K/UL
NEUTROPHILS NFR BLD AUTO: 58 %
NITRITE URINE: NEGATIVE
NONHDLC SERPL-MCNC: 84 MG/DL
PH URINE: 7.5
PLATELET # BLD AUTO: 264 K/UL
POTASSIUM SERPL-SCNC: 4.3 MMOL/L
PROT SERPL-MCNC: 6.7 G/DL
PROTEIN URINE: NORMAL MG/DL
PSA SERPL-MCNC: 0.6 NG/ML
RBC # BLD: 5.25 M/UL
RBC # FLD: 14.8 %
SODIUM SERPL-SCNC: 144 MMOL/L
SPECIFIC GRAVITY URINE: 1.03
TRIGL SERPL-MCNC: 148 MG/DL
TSH SERPL-ACNC: 1.77 UIU/ML
UROBILINOGEN URINE: 1 MG/DL
VIT B12 SERPL-MCNC: 456 PG/ML
WBC # FLD AUTO: 7.71 K/UL

## 2025-03-07 RX ORDER — ERGOCALCIFEROL 1.25 MG/1
1.25 MG CAPSULE ORAL
Qty: 12 | Refills: 0 | Status: ACTIVE | COMMUNITY
Start: 2025-03-07 | End: 2025-06-05

## 2025-03-07 RX ORDER — SEMAGLUTIDE 0.25 MG/.5ML
0.25 INJECTION, SOLUTION SUBCUTANEOUS
Qty: 1 | Refills: 1 | Status: ACTIVE | COMMUNITY
Start: 2025-03-07 | End: 1900-01-01

## 2025-08-04 ENCOUNTER — APPOINTMENT (OUTPATIENT)
Dept: CT IMAGING | Facility: IMAGING CENTER | Age: 60
End: 2025-08-04
Payer: MEDICARE

## 2025-08-04 ENCOUNTER — OUTPATIENT (OUTPATIENT)
Dept: OUTPATIENT SERVICES | Facility: HOSPITAL | Age: 60
LOS: 1 days | End: 2025-08-04
Payer: MEDICARE

## 2025-08-04 DIAGNOSIS — F17.210 NICOTINE DEPENDENCE, CIGARETTES, UNCOMPLICATED: ICD-10-CM

## 2025-08-04 DIAGNOSIS — Z98.1 ARTHRODESIS STATUS: Chronic | ICD-10-CM

## 2025-08-04 DIAGNOSIS — Z98.890 OTHER SPECIFIED POSTPROCEDURAL STATES: Chronic | ICD-10-CM

## 2025-08-04 PROCEDURE — 71271 CT THORAX LUNG CANCER SCR C-: CPT

## 2025-08-04 PROCEDURE — 71271 CT THORAX LUNG CANCER SCR C-: CPT | Mod: 26

## 2025-08-07 ENCOUNTER — APPOINTMENT (OUTPATIENT)
Dept: CARDIOLOGY | Facility: CLINIC | Age: 60
End: 2025-08-07
Payer: MEDICARE

## 2025-08-07 ENCOUNTER — NON-APPOINTMENT (OUTPATIENT)
Age: 60
End: 2025-08-07

## 2025-08-07 VITALS
RESPIRATION RATE: 18 BRPM | WEIGHT: 261 LBS | HEIGHT: 69 IN | TEMPERATURE: 97.3 F | BODY MASS INDEX: 38.66 KG/M2 | SYSTOLIC BLOOD PRESSURE: 137 MMHG | HEART RATE: 90 BPM | OXYGEN SATURATION: 97 % | DIASTOLIC BLOOD PRESSURE: 89 MMHG

## 2025-08-07 PROCEDURE — 93000 ELECTROCARDIOGRAM COMPLETE: CPT

## 2025-08-07 PROCEDURE — 99214 OFFICE O/P EST MOD 30 MIN: CPT | Mod: 25

## 2025-08-11 ENCOUNTER — LABORATORY RESULT (OUTPATIENT)
Age: 60
End: 2025-08-11

## 2025-08-11 ENCOUNTER — APPOINTMENT (OUTPATIENT)
Dept: INTERNAL MEDICINE | Facility: CLINIC | Age: 60
End: 2025-08-11
Payer: MEDICARE

## 2025-08-11 VITALS
HEART RATE: 83 BPM | BODY MASS INDEX: 37.62 KG/M2 | HEIGHT: 69 IN | OXYGEN SATURATION: 96 % | WEIGHT: 254 LBS | SYSTOLIC BLOOD PRESSURE: 124 MMHG | DIASTOLIC BLOOD PRESSURE: 81 MMHG | TEMPERATURE: 98.2 F

## 2025-08-11 DIAGNOSIS — E78.5 HYPERLIPIDEMIA, UNSPECIFIED: ICD-10-CM

## 2025-08-11 DIAGNOSIS — I10 ESSENTIAL (PRIMARY) HYPERTENSION: ICD-10-CM

## 2025-08-11 DIAGNOSIS — I71.40 ABDOMINAL AORTIC ANEURYSM, WITHOUT RUPTURE, UNSPECIFIED: ICD-10-CM

## 2025-08-11 DIAGNOSIS — M54.50 LOW BACK PAIN, UNSPECIFIED: ICD-10-CM

## 2025-08-11 DIAGNOSIS — F17.210 NICOTINE DEPENDENCE, CIGARETTES, UNCOMPLICATED: ICD-10-CM

## 2025-08-11 DIAGNOSIS — I77.819 AORTIC ECTASIA, UNSPECIFIED SITE: ICD-10-CM

## 2025-08-11 DIAGNOSIS — K57.32 DIVERTICULITIS OF LARGE INTESTINE W/OUT PERFORATION OR ABSCESS W/OUT BLEEDING: ICD-10-CM

## 2025-08-11 DIAGNOSIS — S09.93XA UNSPECIFIED INJURY OF FACE, INITIAL ENCOUNTER: ICD-10-CM

## 2025-08-11 PROCEDURE — 99406 BEHAV CHNG SMOKING 3-10 MIN: CPT

## 2025-08-11 PROCEDURE — 99214 OFFICE O/P EST MOD 30 MIN: CPT | Mod: 25

## 2025-08-11 RX ORDER — NICOTINE 21 MG/24HR
21 PATCH, TRANSDERMAL 24 HOURS TRANSDERMAL DAILY
Qty: 1 | Refills: 2 | Status: ACTIVE | COMMUNITY
Start: 2025-08-11 | End: 1900-01-01

## 2025-08-11 RX ORDER — IBUPROFEN 800 MG/1
800 TABLET, FILM COATED ORAL
Qty: 90 | Refills: 0 | Status: ACTIVE | COMMUNITY
Start: 2025-08-11 | End: 1900-01-01

## 2025-08-13 ENCOUNTER — APPOINTMENT (OUTPATIENT)
Dept: CARDIOLOGY | Facility: CLINIC | Age: 60
End: 2025-08-13

## 2025-08-13 PROCEDURE — 93306 TTE W/DOPPLER COMPLETE: CPT

## 2025-08-14 LAB
25(OH)D3 SERPL-MCNC: 20.9 NG/ML
ALBUMIN SERPL ELPH-MCNC: 4.4 G/DL
ALP BLD-CCNC: 64 U/L
ALT SERPL-CCNC: 23 U/L
ANION GAP SERPL CALC-SCNC: 19 MMOL/L
APPEARANCE: CLEAR
AST SERPL-CCNC: 17 U/L
BASOPHILS # BLD AUTO: 0.05 K/UL
BASOPHILS NFR BLD AUTO: 0.5 %
BILIRUB SERPL-MCNC: 0.4 MG/DL
BILIRUBIN URINE: ABNORMAL
BLOOD URINE: NEGATIVE
BUN SERPL-MCNC: 15 MG/DL
CALCIUM SERPL-MCNC: 9.4 MG/DL
CHLORIDE SERPL-SCNC: 108 MMOL/L
CHOLEST SERPL-MCNC: 133 MG/DL
CO2 SERPL-SCNC: 20 MMOL/L
COLOR: NORMAL
CREAT SERPL-MCNC: 0.98 MG/DL
EGFRCR SERPLBLD CKD-EPI 2021: 89 ML/MIN/1.73M2
EOSINOPHIL # BLD AUTO: 0.28 K/UL
EOSINOPHIL NFR BLD AUTO: 3.1 %
ESTIMATED AVERAGE GLUCOSE: 123 MG/DL
GLUCOSE QUALITATIVE U: NEGATIVE MG/DL
GLUCOSE SERPL-MCNC: 105 MG/DL
HBA1C MFR BLD HPLC: 5.9 %
HCT VFR BLD CALC: 47.5 %
HDLC SERPL-MCNC: 30 MG/DL
HGB BLD-MCNC: 14.8 G/DL
IMM GRANULOCYTES NFR BLD AUTO: 0.1 %
KETONES URINE: NEGATIVE MG/DL
LDLC SERPL-MCNC: 74 MG/DL
LEUKOCYTE ESTERASE URINE: ABNORMAL
LYMPHOCYTES # BLD AUTO: 2.77 K/UL
LYMPHOCYTES NFR BLD AUTO: 30.4 %
MAN DIFF?: NORMAL
MCHC RBC-ENTMCNC: 27 PG
MCHC RBC-ENTMCNC: 31.2 G/DL
MCV RBC AUTO: 86.7 FL
MONOCYTES # BLD AUTO: 0.55 K/UL
MONOCYTES NFR BLD AUTO: 6 %
NEUTROPHILS # BLD AUTO: 5.45 K/UL
NEUTROPHILS NFR BLD AUTO: 59.9 %
NITRITE URINE: NEGATIVE
NONHDLC SERPL-MCNC: 103 MG/DL
PH URINE: 5.5
PLATELET # BLD AUTO: 264 K/UL
POTASSIUM SERPL-SCNC: 4.9 MMOL/L
PROT SERPL-MCNC: 7 G/DL
PROTEIN URINE: NORMAL MG/DL
PSA SERPL-MCNC: 0.63 NG/ML
RBC # BLD: 5.48 M/UL
RBC # FLD: 14.8 %
SODIUM SERPL-SCNC: 147 MMOL/L
SPECIFIC GRAVITY URINE: >1.03
TRIGL SERPL-MCNC: 169 MG/DL
TSH SERPL-ACNC: 1.06 UIU/ML
UROBILINOGEN URINE: 1 MG/DL
VIT B12 SERPL-MCNC: 306 PG/ML
WBC # FLD AUTO: 9.11 K/UL

## 2025-08-16 ENCOUNTER — APPOINTMENT (OUTPATIENT)
Dept: ULTRASOUND IMAGING | Facility: CLINIC | Age: 60
End: 2025-08-16
Payer: MEDICARE

## 2025-08-16 ENCOUNTER — OUTPATIENT (OUTPATIENT)
Dept: OUTPATIENT SERVICES | Facility: HOSPITAL | Age: 60
LOS: 1 days | End: 2025-08-16
Payer: MEDICARE

## 2025-08-16 DIAGNOSIS — Z98.1 ARTHRODESIS STATUS: Chronic | ICD-10-CM

## 2025-08-16 DIAGNOSIS — S09.93XA UNSPECIFIED INJURY OF FACE, INITIAL ENCOUNTER: ICD-10-CM

## 2025-08-16 DIAGNOSIS — Z98.890 OTHER SPECIFIED POSTPROCEDURAL STATES: Chronic | ICD-10-CM

## 2025-08-16 DIAGNOSIS — Z00.8 ENCOUNTER FOR OTHER GENERAL EXAMINATION: ICD-10-CM

## 2025-08-16 PROCEDURE — 76775 US EXAM ABDO BACK WALL LIM: CPT | Mod: 26

## 2025-08-16 PROCEDURE — 76536 US EXAM OF HEAD AND NECK: CPT

## 2025-08-16 PROCEDURE — 76775 US EXAM ABDO BACK WALL LIM: CPT

## 2025-08-16 PROCEDURE — 76536 US EXAM OF HEAD AND NECK: CPT | Mod: 26
